# Patient Record
Sex: MALE | Race: WHITE | NOT HISPANIC OR LATINO | URBAN - METROPOLITAN AREA
[De-identification: names, ages, dates, MRNs, and addresses within clinical notes are randomized per-mention and may not be internally consistent; named-entity substitution may affect disease eponyms.]

---

## 2024-03-29 ENCOUNTER — INPATIENT (INPATIENT)
Facility: HOSPITAL | Age: 49
LOS: 10 days | Discharge: PSYCHIATRIC OR SUBSTANCE USE FACILITY | End: 2024-04-09
Attending: HOSPITALIST | Admitting: INTERNAL MEDICINE
Payer: MEDICAID

## 2024-03-29 VITALS
WEIGHT: 240.08 LBS | HEART RATE: 125 BPM | RESPIRATION RATE: 24 BRPM | DIASTOLIC BLOOD PRESSURE: 79 MMHG | OXYGEN SATURATION: 99 % | SYSTOLIC BLOOD PRESSURE: 136 MMHG

## 2024-03-29 DIAGNOSIS — E87.29 OTHER ACIDOSIS: ICD-10-CM

## 2024-03-29 LAB
ALBUMIN SERPL ELPH-MCNC: 4.2 G/DL — SIGNIFICANT CHANGE UP (ref 3.5–5.2)
ALBUMIN SERPL ELPH-MCNC: 4.7 G/DL — SIGNIFICANT CHANGE UP (ref 3.5–5.2)
ALP SERPL-CCNC: 79 U/L — SIGNIFICANT CHANGE UP (ref 30–115)
ALP SERPL-CCNC: 87 U/L — SIGNIFICANT CHANGE UP (ref 30–115)
ALT FLD-CCNC: 22 U/L — SIGNIFICANT CHANGE UP (ref 0–41)
ALT FLD-CCNC: 24 U/L — SIGNIFICANT CHANGE UP (ref 0–41)
AMPHET UR-MCNC: NEGATIVE — SIGNIFICANT CHANGE UP
ANION GAP SERPL CALC-SCNC: 10 MMOL/L — SIGNIFICANT CHANGE UP (ref 7–14)
ANION GAP SERPL CALC-SCNC: 32 MMOL/L — HIGH (ref 7–14)
APAP SERPL-MCNC: <5 UG/ML — LOW (ref 10–30)
APPEARANCE UR: CLEAR — SIGNIFICANT CHANGE UP
AST SERPL-CCNC: 17 U/L — SIGNIFICANT CHANGE UP (ref 0–41)
AST SERPL-CCNC: 25 U/L — SIGNIFICANT CHANGE UP (ref 0–41)
BARBITURATES UR SCN-MCNC: NEGATIVE — SIGNIFICANT CHANGE UP
BASE EXCESS BLDV CALC-SCNC: 2 MMOL/L — SIGNIFICANT CHANGE UP (ref -2–3)
BASOPHILS # BLD AUTO: 0 K/UL — SIGNIFICANT CHANGE UP (ref 0–0.2)
BASOPHILS NFR BLD AUTO: 0 % — SIGNIFICANT CHANGE UP (ref 0–1)
BENZODIAZ UR-MCNC: POSITIVE
BILIRUB DIRECT SERPL-MCNC: 0.2 MG/DL — SIGNIFICANT CHANGE UP (ref 0–0.3)
BILIRUB INDIRECT FLD-MCNC: 0.8 MG/DL — SIGNIFICANT CHANGE UP (ref 0.2–1.2)
BILIRUB SERPL-MCNC: 1 MG/DL — SIGNIFICANT CHANGE UP (ref 0.2–1.2)
BILIRUB SERPL-MCNC: 1.1 MG/DL — SIGNIFICANT CHANGE UP (ref 0.2–1.2)
BILIRUB UR-MCNC: NEGATIVE — SIGNIFICANT CHANGE UP
BUN SERPL-MCNC: 14 MG/DL — SIGNIFICANT CHANGE UP (ref 10–20)
BUN SERPL-MCNC: 9 MG/DL — LOW (ref 10–20)
CA-I SERPL-SCNC: 1.13 MMOL/L — LOW (ref 1.15–1.33)
CALCIUM SERPL-MCNC: 8.6 MG/DL — SIGNIFICANT CHANGE UP (ref 8.4–10.5)
CALCIUM SERPL-MCNC: 9.7 MG/DL — SIGNIFICANT CHANGE UP (ref 8.4–10.5)
CHLORIDE SERPL-SCNC: 107 MMOL/L — SIGNIFICANT CHANGE UP (ref 98–110)
CHLORIDE SERPL-SCNC: 96 MMOL/L — LOW (ref 98–110)
CK SERPL-CCNC: 107 U/L — SIGNIFICANT CHANGE UP (ref 0–225)
CO2 SERPL-SCNC: 13 MMOL/L — LOW (ref 17–32)
CO2 SERPL-SCNC: 26 MMOL/L — SIGNIFICANT CHANGE UP (ref 17–32)
COCAINE METAB.OTHER UR-MCNC: NEGATIVE — SIGNIFICANT CHANGE UP
COLOR SPEC: YELLOW — SIGNIFICANT CHANGE UP
CREAT SERPL-MCNC: 0.9 MG/DL — SIGNIFICANT CHANGE UP (ref 0.7–1.5)
CREAT SERPL-MCNC: 1.3 MG/DL — SIGNIFICANT CHANGE UP (ref 0.7–1.5)
DIFF PNL FLD: NEGATIVE — SIGNIFICANT CHANGE UP
DRUG SCREEN 1, URINE RESULT: SIGNIFICANT CHANGE UP
EGFR: 105 ML/MIN/1.73M2 — SIGNIFICANT CHANGE UP
EGFR: 67 ML/MIN/1.73M2 — SIGNIFICANT CHANGE UP
EOSINOPHIL NFR BLD AUTO: 0 % — SIGNIFICANT CHANGE UP (ref 0–8)
ETHANOL SERPL-MCNC: <10 MG/DL — SIGNIFICANT CHANGE UP
FENTANYL UR QL: NEGATIVE — SIGNIFICANT CHANGE UP
GAS PNL BLDV: 137 MMOL/L — SIGNIFICANT CHANGE UP (ref 136–145)
GAS PNL BLDV: SIGNIFICANT CHANGE UP
GLUCOSE SERPL-MCNC: 122 MG/DL — HIGH (ref 70–99)
GLUCOSE SERPL-MCNC: 269 MG/DL — HIGH (ref 70–99)
GLUCOSE UR QL: NEGATIVE MG/DL — SIGNIFICANT CHANGE UP
HCO3 BLDV-SCNC: 26 MMOL/L — SIGNIFICANT CHANGE UP (ref 22–29)
HCT VFR BLD CALC: 48.3 % — SIGNIFICANT CHANGE UP (ref 42–52)
HCT VFR BLDA CALC: 47 % — SIGNIFICANT CHANGE UP (ref 39–51)
HGB BLD CALC-MCNC: 15.5 G/DL — SIGNIFICANT CHANGE UP (ref 12.6–17.4)
HGB BLD-MCNC: 17.4 G/DL — SIGNIFICANT CHANGE UP (ref 14–18)
KETONES UR-MCNC: NEGATIVE MG/DL — SIGNIFICANT CHANGE UP
LACTATE BLDV-MCNC: 0.8 MMOL/L — SIGNIFICANT CHANGE UP (ref 0.5–2)
LEUKOCYTE ESTERASE UR-ACNC: NEGATIVE — SIGNIFICANT CHANGE UP
LYMPHOCYTES # BLD AUTO: 25 % — SIGNIFICANT CHANGE UP (ref 20.5–51.1)
LYMPHOCYTES # BLD AUTO: 4.75 K/UL — HIGH (ref 1.2–3.4)
MAGNESIUM SERPL-MCNC: 2.2 MG/DL — SIGNIFICANT CHANGE UP (ref 1.8–2.4)
MAGNESIUM SERPL-MCNC: 2.3 MG/DL — SIGNIFICANT CHANGE UP (ref 1.8–2.4)
MANUAL SMEAR VERIFICATION: SIGNIFICANT CHANGE UP
MCHC RBC-ENTMCNC: 31.4 PG — HIGH (ref 27–31)
MCHC RBC-ENTMCNC: 36 G/DL — SIGNIFICANT CHANGE UP (ref 32–37)
MCV RBC AUTO: 87 FL — SIGNIFICANT CHANGE UP (ref 80–94)
METHADONE UR-MCNC: NEGATIVE — SIGNIFICANT CHANGE UP
MONOCYTES # BLD AUTO: 1.33 K/UL — HIGH (ref 0.1–0.6)
MONOCYTES NFR BLD AUTO: 7 % — SIGNIFICANT CHANGE UP (ref 1.7–9.3)
NEUTROPHILS # BLD AUTO: 12.93 K/UL — HIGH (ref 1.4–6.5)
NEUTROPHILS NFR BLD AUTO: 68 % — SIGNIFICANT CHANGE UP (ref 42.2–75.2)
NITRITE UR-MCNC: NEGATIVE — SIGNIFICANT CHANGE UP
NRBC # BLD: 0 /100 WBCS — SIGNIFICANT CHANGE UP (ref 0–0)
NRBC # BLD: SIGNIFICANT CHANGE UP /100 WBCS (ref 0–0)
OPIATES UR-MCNC: NEGATIVE — SIGNIFICANT CHANGE UP
OXYCODONE UR-MCNC: NEGATIVE — SIGNIFICANT CHANGE UP
PCO2 BLDV: 36 MMHG — LOW (ref 42–55)
PCP UR-MCNC: NEGATIVE — SIGNIFICANT CHANGE UP
PH BLDV: 7.46 — HIGH (ref 7.32–7.43)
PH UR: 7 — SIGNIFICANT CHANGE UP (ref 5–8)
PHOSPHATE SERPL-MCNC: 3.2 MG/DL — SIGNIFICANT CHANGE UP (ref 2.1–4.9)
PLAT MORPH BLD: NORMAL — SIGNIFICANT CHANGE UP
PLATELET # BLD AUTO: 235 K/UL — SIGNIFICANT CHANGE UP (ref 130–400)
PMV BLD: 13 FL — HIGH (ref 7.4–10.4)
PO2 BLDV: 49 MMHG — HIGH (ref 25–45)
POTASSIUM BLDV-SCNC: 3.1 MMOL/L — LOW (ref 3.5–5.1)
POTASSIUM SERPL-MCNC: 2.9 MMOL/L — LOW (ref 3.5–5)
POTASSIUM SERPL-MCNC: 3.4 MMOL/L — LOW (ref 3.5–5)
POTASSIUM SERPL-SCNC: 2.9 MMOL/L — LOW (ref 3.5–5)
POTASSIUM SERPL-SCNC: 3.4 MMOL/L — LOW (ref 3.5–5)
PROPOXYPHENE QUALITATIVE URINE RESULT: NEGATIVE — SIGNIFICANT CHANGE UP
PROT SERPL-MCNC: 6.3 G/DL — SIGNIFICANT CHANGE UP (ref 6–8)
PROT SERPL-MCNC: 6.7 G/DL — SIGNIFICANT CHANGE UP (ref 6–8)
PROT UR-MCNC: NEGATIVE MG/DL — SIGNIFICANT CHANGE UP
RBC # BLD: 5.55 M/UL — SIGNIFICANT CHANGE UP (ref 4.7–6.1)
RBC # FLD: 11.4 % — LOW (ref 11.5–14.5)
RBC BLD AUTO: NORMAL — SIGNIFICANT CHANGE UP
SALICYLATES SERPL-MCNC: <0.3 MG/DL — LOW (ref 4–30)
SAO2 % BLDV: 85.4 % — SIGNIFICANT CHANGE UP (ref 67–88)
SARS-COV-2 RNA SPEC QL NAA+PROBE: SIGNIFICANT CHANGE UP
SODIUM SERPL-SCNC: 141 MMOL/L — SIGNIFICANT CHANGE UP (ref 135–146)
SODIUM SERPL-SCNC: 143 MMOL/L — SIGNIFICANT CHANGE UP (ref 135–146)
SP GR SPEC: 1.01 — SIGNIFICANT CHANGE UP (ref 1–1.03)
THC UR QL: NEGATIVE — SIGNIFICANT CHANGE UP
UROBILINOGEN FLD QL: 1 MG/DL — SIGNIFICANT CHANGE UP (ref 0.2–1)
WBC # BLD: 19.01 K/UL — HIGH (ref 4.8–10.8)
WBC # FLD AUTO: 19.01 K/UL — HIGH (ref 4.8–10.8)

## 2024-03-29 PROCEDURE — 83605 ASSAY OF LACTIC ACID: CPT

## 2024-03-29 PROCEDURE — 84295 ASSAY OF SERUM SODIUM: CPT

## 2024-03-29 PROCEDURE — 99223 1ST HOSP IP/OBS HIGH 75: CPT

## 2024-03-29 PROCEDURE — 85027 COMPLETE CBC AUTOMATED: CPT

## 2024-03-29 PROCEDURE — 82088 ASSAY OF ALDOSTERONE: CPT

## 2024-03-29 PROCEDURE — 70551 MRI BRAIN STEM W/O DYE: CPT | Mod: MC

## 2024-03-29 PROCEDURE — 84244 ASSAY OF RENIN: CPT

## 2024-03-29 PROCEDURE — 82803 BLOOD GASES ANY COMBINATION: CPT

## 2024-03-29 PROCEDURE — 84132 ASSAY OF SERUM POTASSIUM: CPT

## 2024-03-29 PROCEDURE — 84484 ASSAY OF TROPONIN QUANT: CPT

## 2024-03-29 PROCEDURE — 74177 CT ABD & PELVIS W/CONTRAST: CPT | Mod: MC

## 2024-03-29 PROCEDURE — 82962 GLUCOSE BLOOD TEST: CPT

## 2024-03-29 PROCEDURE — 70450 CT HEAD/BRAIN W/O DYE: CPT | Mod: 26,MC

## 2024-03-29 PROCEDURE — 83835 ASSAY OF METANEPHRINES: CPT

## 2024-03-29 PROCEDURE — 83735 ASSAY OF MAGNESIUM: CPT

## 2024-03-29 PROCEDURE — 80053 COMPREHEN METABOLIC PANEL: CPT

## 2024-03-29 PROCEDURE — 93005 ELECTROCARDIOGRAM TRACING: CPT

## 2024-03-29 PROCEDURE — 84100 ASSAY OF PHOSPHORUS: CPT

## 2024-03-29 PROCEDURE — 85025 COMPLETE CBC W/AUTO DIFF WBC: CPT

## 2024-03-29 PROCEDURE — 36415 COLL VENOUS BLD VENIPUNCTURE: CPT

## 2024-03-29 PROCEDURE — 85018 HEMOGLOBIN: CPT

## 2024-03-29 PROCEDURE — 80048 BASIC METABOLIC PNL TOTAL CA: CPT

## 2024-03-29 PROCEDURE — 71045 X-RAY EXAM CHEST 1 VIEW: CPT | Mod: 26

## 2024-03-29 PROCEDURE — 82533 TOTAL CORTISOL: CPT

## 2024-03-29 PROCEDURE — 93306 TTE W/DOPPLER COMPLETE: CPT

## 2024-03-29 PROCEDURE — 82330 ASSAY OF CALCIUM: CPT

## 2024-03-29 PROCEDURE — 85014 HEMATOCRIT: CPT

## 2024-03-29 PROCEDURE — 80076 HEPATIC FUNCTION PANEL: CPT

## 2024-03-29 PROCEDURE — 99285 EMERGENCY DEPT VISIT HI MDM: CPT

## 2024-03-29 PROCEDURE — 87635 SARS-COV-2 COVID-19 AMP PRB: CPT

## 2024-03-29 RX ORDER — AMLODIPINE BESYLATE 2.5 MG/1
10 TABLET ORAL ONCE
Refills: 0 | Status: COMPLETED | OUTPATIENT
Start: 2024-03-29 | End: 2024-03-29

## 2024-03-29 RX ORDER — SODIUM CHLORIDE 9 MG/ML
1000 INJECTION, SOLUTION INTRAVENOUS
Refills: 0 | Status: DISCONTINUED | OUTPATIENT
Start: 2024-03-29 | End: 2024-03-29

## 2024-03-29 RX ORDER — POTASSIUM CHLORIDE 20 MEQ
40 PACKET (EA) ORAL ONCE
Refills: 0 | Status: COMPLETED | OUTPATIENT
Start: 2024-03-29 | End: 2024-03-29

## 2024-03-29 RX ORDER — HALOPERIDOL DECANOATE 100 MG/ML
5 INJECTION INTRAMUSCULAR ONCE
Refills: 0 | Status: COMPLETED | OUTPATIENT
Start: 2024-03-29 | End: 2024-03-29

## 2024-03-29 RX ORDER — MIDAZOLAM HYDROCHLORIDE 1 MG/ML
5 INJECTION, SOLUTION INTRAMUSCULAR; INTRAVENOUS ONCE
Refills: 0 | Status: DISCONTINUED | OUTPATIENT
Start: 2024-03-29 | End: 2024-03-29

## 2024-03-29 RX ORDER — NICOTINE POLACRILEX 2 MG
1 GUM BUCCAL DAILY
Refills: 0 | Status: DISCONTINUED | OUTPATIENT
Start: 2024-03-29 | End: 2024-03-30

## 2024-03-29 RX ORDER — SODIUM CHLORIDE 9 MG/ML
1000 INJECTION, SOLUTION INTRAVENOUS ONCE
Refills: 0 | Status: COMPLETED | OUTPATIENT
Start: 2024-03-29 | End: 2024-03-29

## 2024-03-29 RX ORDER — ENOXAPARIN SODIUM 100 MG/ML
40 INJECTION SUBCUTANEOUS EVERY 24 HOURS
Refills: 0 | Status: DISCONTINUED | OUTPATIENT
Start: 2024-03-29 | End: 2024-04-09

## 2024-03-29 RX ORDER — THIAMINE MONONITRATE (VIT B1) 100 MG
100 TABLET ORAL DAILY
Refills: 0 | Status: DISCONTINUED | OUTPATIENT
Start: 2024-03-30 | End: 2024-04-09

## 2024-03-29 RX ORDER — THIAMINE MONONITRATE (VIT B1) 100 MG
100 TABLET ORAL ONCE
Refills: 0 | Status: COMPLETED | OUTPATIENT
Start: 2024-03-29 | End: 2024-03-29

## 2024-03-29 RX ORDER — CHLORHEXIDINE GLUCONATE 213 G/1000ML
1 SOLUTION TOPICAL
Refills: 0 | Status: DISCONTINUED | OUTPATIENT
Start: 2024-03-29 | End: 2024-04-01

## 2024-03-29 RX ORDER — DIAZEPAM 5 MG
5 TABLET ORAL ONCE
Refills: 0 | Status: DISCONTINUED | OUTPATIENT
Start: 2024-03-29 | End: 2024-03-29

## 2024-03-29 RX ORDER — ACETAMINOPHEN 500 MG
650 TABLET ORAL EVERY 6 HOURS
Refills: 0 | Status: DISCONTINUED | OUTPATIENT
Start: 2024-03-29 | End: 2024-04-09

## 2024-03-29 RX ORDER — SODIUM CHLORIDE 9 MG/ML
1000 INJECTION, SOLUTION INTRAVENOUS
Refills: 0 | Status: DISCONTINUED | OUTPATIENT
Start: 2024-03-29 | End: 2024-04-03

## 2024-03-29 RX ORDER — POTASSIUM CHLORIDE 20 MEQ
20 PACKET (EA) ORAL ONCE
Refills: 0 | Status: COMPLETED | OUTPATIENT
Start: 2024-03-29 | End: 2024-03-29

## 2024-03-29 RX ORDER — DIAZEPAM 5 MG
10 TABLET ORAL ONCE
Refills: 0 | Status: DISCONTINUED | OUTPATIENT
Start: 2024-03-29 | End: 2024-03-29

## 2024-03-29 RX ORDER — LANOLIN ALCOHOL/MO/W.PET/CERES
3 CREAM (GRAM) TOPICAL AT BEDTIME
Refills: 0 | Status: DISCONTINUED | OUTPATIENT
Start: 2024-03-29 | End: 2024-04-09

## 2024-03-29 RX ORDER — ONDANSETRON 8 MG/1
4 TABLET, FILM COATED ORAL EVERY 8 HOURS
Refills: 0 | Status: DISCONTINUED | OUTPATIENT
Start: 2024-03-29 | End: 2024-04-04

## 2024-03-29 RX ORDER — FOLIC ACID 0.8 MG
1 TABLET ORAL DAILY
Refills: 0 | Status: DISCONTINUED | OUTPATIENT
Start: 2024-03-29 | End: 2024-04-09

## 2024-03-29 RX ADMIN — Medication 2 MILLIGRAM(S): at 01:13

## 2024-03-29 RX ADMIN — MIDAZOLAM HYDROCHLORIDE 5 MILLIGRAM(S): 1 INJECTION, SOLUTION INTRAMUSCULAR; INTRAVENOUS at 00:48

## 2024-03-29 RX ADMIN — Medication 10 MILLIGRAM(S): at 01:07

## 2024-03-29 RX ADMIN — SODIUM CHLORIDE 125 MILLILITER(S): 9 INJECTION, SOLUTION INTRAVENOUS at 17:25

## 2024-03-29 RX ADMIN — Medication 20 MILLIEQUIVALENT(S): at 15:05

## 2024-03-29 RX ADMIN — AMLODIPINE BESYLATE 10 MILLIGRAM(S): 2.5 TABLET ORAL at 18:35

## 2024-03-29 RX ADMIN — Medication 1 TABLET(S): at 17:30

## 2024-03-29 RX ADMIN — Medication 50 MILLIEQUIVALENT(S): at 03:00

## 2024-03-29 RX ADMIN — SODIUM CHLORIDE 1000 MILLILITER(S): 9 INJECTION, SOLUTION INTRAVENOUS at 03:00

## 2024-03-29 RX ADMIN — ENOXAPARIN SODIUM 40 MILLIGRAM(S): 100 INJECTION SUBCUTANEOUS at 17:29

## 2024-03-29 RX ADMIN — Medication 1 MILLIGRAM(S): at 17:29

## 2024-03-29 RX ADMIN — Medication 1 PATCH: at 22:07

## 2024-03-29 RX ADMIN — Medication 40 MILLIEQUIVALENT(S): at 22:07

## 2024-03-29 RX ADMIN — Medication 100 MILLIGRAM(S): at 07:08

## 2024-03-29 RX ADMIN — Medication 25 MILLIGRAM(S): at 22:07

## 2024-03-29 RX ADMIN — HALOPERIDOL DECANOATE 5 MILLIGRAM(S): 100 INJECTION INTRAMUSCULAR at 01:14

## 2024-03-29 RX ADMIN — Medication 5 MILLIGRAM(S): at 07:57

## 2024-03-29 NOTE — ED ADULT NURSE REASSESSMENT NOTE - NS ED NURSE REASSESS COMMENT FT1
Pt driven to ED and removed from car to stretcher.  Pt extremely combative.  5 haldol and 2 ativan given on arrival. Security at bedside and 4 point restraints applied.  5 versed given.  IV established and placed on CM and end tidal

## 2024-03-29 NOTE — ED PROVIDER NOTE - OBJECTIVE STATEMENT
49-year-old male Irish-speaking brought in by hospital security and EMS for agitated behavior outside of the hospital.  Patient was aggressive requiring 4-point restraints chemical sedation by Dr. Ha.  Patient without signs of trauma.  ABCs intact.  Vitally stable.  Patient reportedly stated that he was drinking a lot of alcohol prior to coming into the ER.  Reportedly 1 point Evacal every day never stopped 2 days ago.  Patient given 5 of Haldol to Ativan and 5 of Versed 10 of IV Valium. 49-year-old male Rwandan-speaking brought in by hospital security and EMS for agitated behavior outside of the hospital.  Patient was aggressive requiring 4-point restraints chemical sedation.  Patient without signs of trauma.  ABCs intact.  Vitally stable.  Patient reportedly stated that he was drinking a lot of alcohol prior to coming into the ER.  Reportedly 1 pint of alcohol every day never stopped 2 days ago.  Patient given 5 of Haldol to Ativan and 5 of Versed 10 of IV Valium.

## 2024-03-29 NOTE — ED ADULT TRIAGE NOTE - CHIEF COMPLAINT QUOTE
Assisted from car, extremely agitated.  Per family, pt drinks approx. 1 pint vodka daily and has not had a drink in 2 1/2 days

## 2024-03-29 NOTE — PATIENT PROFILE ADULT - STATED REASON FOR ADMISSION
I was driving with friend and did  not feel good, confused I was driving with friend and did  not feel good, confused, I took 7 melatonin tabs last night to sleep.

## 2024-03-29 NOTE — BH CHART NOTE - NSEVENTNOTEFT_PSY_ALL_CORE
Psychiatry consult request received today at 16:07 for routine psychiatry evaluation of suicidality.     Consult information obtained as follows: 48 yo, Chinese speaking male, BIB EMS a/b a friend, presented very agitated requiring multiple rounds of sedatives and reported heavy drinking along w/ SI, BAL was negative, today denying excessive drinking and SI but is a bit confused, not attending well on interview, reports hallucinations but also says in his dreams and does have some dysautonomia so he is being treated for alcohol withdrawal but remains on 1:1 for suicide precautions pending psych evaluation.     This writer informed that [facility] psychiatry will not be able to see patient today and to place the consult request with on-call weekend psychiatry coverage for patient to be seen on Saturday. Otherwise [facility] can follow up on patient Monday.

## 2024-03-29 NOTE — H&P ADULT - NSHPLABSRESULTS_GEN_ALL_CORE
17.4   19. )-----------( 235      ( 29 Mar 2024 01:42 )             48.3           141  |  96<L>  |  14  ----------------------------<  269<H>  2.9<L>   |  13<L>  |  1.3    Ca    9.7      29 Mar 2024 01:42  Mg     2.3         TPro  6.7  /  Alb  4.7  /  TBili  1.1  /  DBili  x   /  AST  17  /  ALT  24  /  AlkPhos  87              Urinalysis Basic - ( 29 Mar 2024 03:45 )    Color: Yellow / Appearance: Clear / S.009 / pH: x  Gluc: x / Ketone: Negative mg/dL  / Bili: Negative / Urobili: 1.0 mg/dL   Blood: x / Protein: Negative mg/dL / Nitrite: Negative   Leuk Esterase: Negative / RBC: x / WBC x   Sq Epi: x / Non Sq Epi: x / Bacteria: x        CARDIAC MARKERS ( 29 Mar 2024 01:42 )  x     / x     / 107 U/L / x     / x            < from: CT Head No Cont (24 @ 01:52) >      < from: Xray Chest 1 View AP/PA (24 @ 02:15) >      Impression:    No radiographic evidence of acute cardiopulmonary disease.        < from: 12 Lead ECG (24 @ 04:48) >      Diagnosis Line Sinus bradycardia with 1st degree A-V block  Otherwise normal ECG

## 2024-03-29 NOTE — H&P ADULT - NS ATTEND AMEND GEN_ALL_CORE FT
49-year-old German-speaking male brought in by hospital security and EMS for agitated behavior outside of the hospital.  Patient was aggressive in ED requiring 4-point restraints and chemical sedation.  Patient reportedly stated to EMS that he was drinking a lot of alcohol prior to coming into the ER.  Reportedly 1 pint of alcohol every day and stopped 2 days ago    alcohol withdrawal / alcoholic ketoacidosis / suicidal ideation / suspected thiamine and folate deficiency     - IV ativan detox protocol   - D51/2NS   - check serial chemistries   - may add Precedex if agitated with ativan   - thiamine and folate   - 1:1 sit   - psych and detox consults   - DVT prophylaxis 49-year-old Macedonian-speaking male brought in by hospital security and EMS for agitated behavior outside of the hospital.  Patient was aggressive in ED requiring 4-point restraints and chemical sedation.  Patient reportedly stated to EMS that he was drinking a lot of alcohol prior to coming into the ER.  Reportedly 1 pint of alcohol every day and stopped 2 days ago    alcohol withdrawal / alcoholic ketoacidosis / suicidal ideation / suspected thiamine and folate deficiency / hypokalemia     - IV ativan detox protocol   - D51/2NS   - supplement lytes   - check serial chemistries   - may add Precedex if agitated with ativan   - thiamine and folate   - 1:1 sit   - psych and detox consults   - DVT prophylaxis

## 2024-03-29 NOTE — CHART NOTE - NSCHARTNOTEFT_GEN_A_CORE
Patient seen at bedside.   Awake + alert oriented x 3.   Prydeinig  utilized [id number]     Patient states that he does not drink ETOH regularly.   Last drink was on his birthday 6 days ago.   On exam patient with good mental status. No asterixis.     Case discussed with Intensivist on duty     Recommends holding standing Ativan   Start on Symptom triggered Ativan therapy + small dose Librium taper.     Cont 1:1 monitoring for suicidal ideation + CIWA score.     Lance MANCUSO

## 2024-03-29 NOTE — H&P ADULT - NSHPPHYSICALEXAM_GEN_ALL_CORE
Vital Signs Last 24 Hrs  T(C): 36.4 (29 Mar 2024 15:08), Max: 36.4 (29 Mar 2024 15:08)  T(F): 97.5 (29 Mar 2024 15:08), Max: 97.5 (29 Mar 2024 15:08)  HR: 85 (29 Mar 2024 15:08) (59 - 125)  BP: 176/100 (29 Mar 2024 15:08) (96/58 - 176/100)  BP(mean): 123 (29 Mar 2024 07:46) (123 - 123)  RR: 20 (29 Mar 2024 15:08) (16 - 24)  SpO2: 98% (29 Mar 2024 15:08) (95% - 100%)    PHYSICAL EXAM:      Constitutional: A&Ox2, know year and month, intermittent confusion and difficulty sustaining attention, some of his answers are not making sense, for example reports taking blood pressure medicine Norvasc for his depression  Respiratory: cta b/l  Cardiovascular: s1 s2 rrr  Gastrointestinal: soft nt  nd + bs no rebound or guarding  Genitourinary: no cva tenderness  Extremities: normal rom, no edema, calf tenderness  Neurological:no focal deficits  Skin: no rash

## 2024-03-29 NOTE — ED PROVIDER NOTE - CLINICAL SUMMARY MEDICAL DECISION MAKING FREE TEXT BOX
Patient likely an alcoholic keto acidosis complicated by DTs.  Patient will be admitted to a monitored setting.

## 2024-03-29 NOTE — H&P ADULT - ASSESSMENT
Patient is a 49-year-old male Emirati-speaking brought in by hospital security and EMS for agitated behavior outside of the hospital.  Patient was aggressive in ED requiring 4-point restraints chemical sedation.  Patient reportedly stated to EMS that he was drinking a lot of alcohol prior to coming into the ER.  Reportedly 1 pint of alcohol every day never stopped 2 days ago.  Patient currently denies abusing alcohol stating he drinks 1 bottle of beer a month. Patient states last drink was about 1 week ago. Patient denies hx of tremors or seizures. Patient reports visual hallucinations in dreams. Patient reported suicidal ideations in ED but currently denies any. Patient states has a hx of depression and suicidal ideations.     #alcohol abuse with withdrawal suspected delirium tremens   #metabolic acidosis with elevated anion gap secondary to above  #hypokalemia  -admit to stepdown  -telemetry monitoring  -constant observation  -psychiatry consult  -addiction medicine consult  -ativan taper IV  -thiamine and folic acid  -D51/2 ns at 125cc/hr  -trend bmp  -monitor lytes and supplement prn  -d/w Dr Son    #hx of htn  -monitor bp  -dash diet  -consider adding a catapress prn but will be receiving iv ativan so assess response first    #DVT prophylaxis  -SCD and lovenox  #CHG bath Patient is a 49-year-old male Latvian-speaking brought in by hospital security and EMS for agitated behavior outside of the hospital.  Patient was aggressive in ED requiring 4-point restraints chemical sedation.  Patient reportedly stated to EMS that he was drinking a lot of alcohol prior to coming into the ER.  Reportedly 1 pint of alcohol every day never stopped 2 days ago.  Patient currently denies abusing alcohol stating he drinks 1 bottle of beer a month. Patient states last drink was about 1 week ago. Patient denies hx of tremors or seizures. Patient reports visual hallucinations in dreams. Patient reported suicidal ideations in ED but currently denies any. Patient states has a hx of depression and suicidal ideations.     #alcohol abuse with withdrawal suspected delirium tremens   #metabolic acidosis with elevated anion gap secondary to above  #hypokalemia  -admit to stepdown  -telemetry monitoring  -constant observation  -psychiatry consult  -addiction medicine consult  -ativan taper IV  -thiamine and folic acid  -D51/2 ns at 125cc/hr  -trend bmp  -monitor lytes and supplement prn  -check echo as per ICU fellow note  -d/w Dr Son    #hx of htn  -monitor bp  -dash diet  -consider adding a catapress prn but will be receiving iv ativan so assess response first    #DVT prophylaxis  -SCD and lovenox  #CHG bath Patient is a 49-year-old male Belarusian-speaking brought in by hospital security and EMS for agitated behavior outside of the hospital.  Patient was aggressive in ED requiring 4-point restraints chemical sedation.  Patient reportedly stated to EMS that he was drinking a lot of alcohol prior to coming into the ER.  Reportedly 1 pint of alcohol every day never stopped 2 days ago.  Patient currently denies abusing alcohol stating he drinks 1 bottle of beer a month. Patient states last drink was about 1 week ago. Patient denies hx of tremors or seizures. Patient reports visual hallucinations in dreams. Patient reported suicidal ideations in ED but currently denies any. Patient states has a hx of depression and suicidal ideations.     #alcohol abuse with withdrawal suspected delirium tremens   #metabolic acidosis with elevated anion gap secondary to above  #hypokalemia  -admit to stepdown  -telemetry monitoring  -constant observation  -psychiatry consult, spoke with [facility] attending, case reviewed, requested the [facility] service be recalled in the morning and they will do the consult then   -addiction medicine consult  -ativan taper IV  -thiamine and folic acid  -D51/2 ns at 125cc/hr  -trend bmp  -monitor lytes and supplement prn  -check echo as per ICU fellow note  -d/w Dr Son    #hx of htn  -monitor bp  -dash diet  -consider adding a catapress prn but will be receiving iv ativan so assess response first    #DVT prophylaxis  -SCD and lovenox  #CHG bath

## 2024-03-29 NOTE — ED PROVIDER NOTE - ATTENDING APP SHARED VISIT CONTRIBUTION OF CARE
49-year-old male, Algerian-speaking presented with his friends, noted to be altered and not responding.  Patient was aggressive and required 4-point restraints and chemical sedation immediately.  Upon further history taking from his friends at bedside they reported that he has been drinking 1 pint of alcohol every day and stopped approximately 2 days ago.  Patient received 5 of Haldol, 2 of Ativan, 5 of Versed and then 10 mg of IV Valium.  Patient was stabilized and signed out pending further evaluation and disposition.

## 2024-03-29 NOTE — H&P ADULT - NSHPREVIEWOFSYSTEMS_GEN_ALL_CORE
General:	no fever, weight loss,  chills  Skin: no rash, ulcers  Ophthalmologic: no visual changes  ENMT:	no sore throat  Respiratory and Thorax: no cough, wheeze,  sob  Cardiovascular:	no chest pain, palpitations, dizziness  Gastrointestinal:	no nausea, vomiting, diarrhea, abd pain  Genitourinary:	no dysuria, hematuria  Musculoskeletal:	no joint pains  Neurological:	 no speech disturbance, focal weakness, numbness  Psychiatric:	+ depression, anxiety, psychosis  Hematology/Lymphatics:	no anemia  Endocrine:	no polyuria, polydipsia

## 2024-03-29 NOTE — CONSULT NOTE ADULT - ASSESSMENT
IMPRESSION:    Altered mental status   EtOH withdrawal   HAGMA possibly AKA vs DKA?    PLAN:    CNS: CIWA monitoring. Thiamine. Folate. UDS positive for BZD, possibly. CTH noted. Psych follow up.     HEENT: Oral care    PULMONARY:  HOB @ 45 degrees. CXR noted, no infiltrate. On 2L O2, wean O2 as tolerated, target SpO2 >92%.     CARDIOVASCULAR: Check TTE. Start D5NS at 100 cc/hour.     GI: GI prophylaxis.  Feeding when awake. S+S eval.     RENAL:  Follow up lytes.  Correct as needed. Monitor Cr, no known baseline.     INFECTIOUS DISEASE: Follow up cultures. UA negative.     HEMATOLOGICAL:  DVT prophylaxis.    ENDOCRINE:  Follow up FS.  Insulin protocol if needed. Check B-OH.     MUSCULOSKELETAL: Bed rest.     SDU          IMPRESSION:    Altered mental status   EtOH withdrawal   HAGMA possibly AKA vs DKA?    PLAN:    CNS: CIWA taper. Thiamine. Folate. UDS positive for BZD, possibly. CTH noted. Psych follow up. If uncontrolled, may need precedex.     HEENT: Oral care    PULMONARY:  HOB @ 45 degrees. CXR noted, no infiltrate. On 2L O2, wean O2 as tolerated, target SpO2 >92%.     CARDIOVASCULAR: Check TTE. Start D5NS at 100 cc/hour. Check lactate.     GI: GI prophylaxis.  Feeding when awake. S+S eval.     RENAL:  Follow up lytes.  Correct as needed. Monitor Cr, no known baseline. Repeat BMP in PM.     INFECTIOUS DISEASE: Follow up cultures. UA negative.     HEMATOLOGICAL:  DVT prophylaxis.    ENDOCRINE:  Follow up FS.  Insulin protocol if needed. Check B-OH.     MUSCULOSKELETAL: Bed rest.     SDU          IMPRESSION:  early DTs  Altered mental status   EtOH withdrawal   HAGMA possibly AKA vs DKA?  Suicidal ideation    PLAN:    CNS: CIWA taper. Thiamine. Folate. UDS positive for BZD, possibly. CTH noted. Psych follow up. If uncontrolled, may need precedex. 1:1 until cleared by psych.     HEENT: Oral care    PULMONARY:  HOB @ 45 degrees. CXR noted, no infiltrate. On 2L O2, wean O2 as tolerated, target SpO2 >92%.     CARDIOVASCULAR: Check TTE. Start D5NS at 100 cc/hour. Check lactate.     GI: GI prophylaxis.  Feeding when awake. S+S eval.     RENAL:  Follow up lytes.  Correct as needed. Monitor Cr, no known baseline. Repeat BMP in PM.     INFECTIOUS DISEASE: Follow up cultures. UA negative. procal    HEMATOLOGICAL:  DVT prophylaxis.    ENDOCRINE:  Follow up FS.  Insulin protocol if needed. Check B-OH.     MUSCULOSKELETAL: Bed rest.     SDU

## 2024-03-29 NOTE — H&P ADULT - HISTORY OF PRESENT ILLNESS
Patient is a 49-year-old male Serbian-speaking brought in by hospital security and EMS for agitated behavior outside of the hospital.  Patient was aggressive in ED requiring 4-point restraints chemical sedation.  Patient reportedly stated to EMS that he was drinking a lot of alcohol prior to coming into the ER.  Reportedly 1 pint of alcohol every day never stopped 2 days ago.  Patient currently denies abusing alcohol stating he drinks 1 bottle of beer a month. Patient states last drink was about 1 week ago. Patient denies hx of tremors or seizures. Patient reports visual hallucinations in dreams. Patient reported suicidal ideations in ED but currently denies any. Patient states has a hx of depression and suicidal ideations.

## 2024-03-29 NOTE — ED PROVIDER NOTE - PROGRESS NOTE DETAILS
Will get labs CT reevaluate Spoke with patient's best friend and son.  Patient reportedly moved to the [place] with his [place] however since then has been having reported visual elucidation's of people that are following him or feeling like people in the street as the [employer] and are coming to find him.  According to best friend and send patient is not a daily alcohol drinker and is advancing does not take any drugs and only recently started drinking approximately 1 to 2 weeks ago because he felt that he had significant insomnia since moving from [place].  Patient believes that the alcohol would help with sleep.  He kept drinking more more however because it was not helping he stopped drinking 2 days ago.  Per patient's best friend patient has been awake for approximately 5 to 7 days without sleep and has been acting erratically. Spoke with patient's best friend and son.  Patient reportedly moved to the [place] with his [place] however since then has been having reported visual hallucinations of people that are following him or feeling like people in the street as the [employer] and are coming to find him.  According to best friend and send patient is not a daily alcohol drinker and is advancing does not take any drugs and only recently started drinking approximately 1 to 2 weeks ago because he felt that he had significant insomnia since moving from [place].  Patient believes that the alcohol would help with sleep.  He kept drinking more more however because it was not helping he stopped drinking 2 days ago.  Per patient's best friend patient has been awake for approximately 5 to 7 days without sleep and has been acting erratically. pt s/o to me from Dr. Ha- labs ekg imaging reviewed. resting comfortably, arousable. ss Patient awake. Consulted tele-psych, will evalaute [facility]: Patient reevaluated.  Patient is much calmer now with only minor tremors at this time.  Diagnostic testing reviewed.  Patient has elevated white count.  He has hypokalemia.  He has acidosis as evidenced by low CO2 level accompanied with a high anion gap.  Chest x-ray is negative CT head negative my opinion, the the patient is likely suffering significant alcohol withdrawal symptoms.  He may begin alcoholic ketoacidosis.  In addition his hallucinations and delusions may be symptomatic of delirium tremens.  Spoke with psychiatry.  Will defer psychiatric consultation at this time.  Case discussed with Dr. Fortune, her attending intensivist.  Patient should be admitted to stepdown unit.  Case to . Discussed with Dr. Son, attending hospitalist.  Accepts patient for admission.

## 2024-03-29 NOTE — PATIENT PROFILE ADULT - SURGICAL SITE INCISION
Patient is a 50y old  Male who presents with a chief complaint of PE (06 Jun 2019 07:07)      50 year old male with PMH of  HTN and DM2, who presented with Perforated Antral Gastric Ulcer, s/p Emergent Exp-lap Oomentopexy and plication 5/11, Proxismal afib,  with elevated troponin post-operatively indicative of NSTEMI, started on Amiodarone and converted to sinus rhythm,  left foot osteo s/p debridement 5/28, now presenting for CP and dyspnea x 1 day. Admitted for sub segmental PE RUL. on Heparin     Interval HPI/Overnight events:       T(C): 36.8 (06-06-19 @ 07:19), Max: 38.2 (06-06-19 @ 03:39)  HR: 55 (06-06-19 @ 07:19) (55 - 90)  BP: 119/77 (06-06-19 @ 07:19) (115/69 - 160/75)  RR: 17 (06-06-19 @ 07:19) (16 - 18)  SpO2: 96% (06-06-19 @ 07:19) (94% - 97%)  Wt(kg): --    I&O's Summary    05 Jun 2019 07:01  -  06 Jun 2019 07:00  --------------------------------------------------------  IN: 570 mL / OUT: 0 mL / NET: 570 mL    06 Jun 2019 07:01  -  06 Jun 2019 08:22  --------------------------------------------------------  IN: 15 mL / OUT: 0 mL / NET: 15 mL        LABS:                        8.4    6.71  )-----------( 306      ( 06 Jun 2019 07:54 )             26.7     06-06    144  |  111<H>  |  15  ----------------------------<  193<H>  3.7   |  26  |  1.30    Ca    7.9<L>      06 Jun 2019 05:05    TPro  6.7  /  Alb  2.9<L>  /  TBili  0.2  /  DBili  x   /  AST  18  /  ALT  17  /  AlkPhos  118  06-05    PT/INR - ( 06 Jun 2019 01:27 )   PT: 12.8 sec;   INR: 1.13 ratio         PTT - ( 06 Jun 2019 07:54 )  PTT:63.1 sec  CAPILLARY BLOOD GLUCOSE      POCT Blood Glucose.: 208 mg/dL (06 Jun 2019 07:44)  POCT Blood Glucose.: 106 mg/dL (05 Jun 2019 11:41)             MEDICATIONS  (STANDING):  amiodarone    Tablet 200 milliGRAM(s) Oral daily  dextrose 5%. 1000 milliLiter(s) (50 mL/Hr) IV Continuous <Continuous>  dextrose 50% Injectable 12.5 Gram(s) IV Push once  dextrose 50% Injectable 25 Gram(s) IV Push once  dextrose 50% Injectable 25 Gram(s) IV Push once  docusate sodium 100 milliGRAM(s) Oral three times a day  heparin  Infusion.  Unit(s)/Hr (15 mL/Hr) IV Continuous <Continuous>  insulin glargine Injectable (LANTUS) 10 Unit(s) SubCutaneous at bedtime  insulin lispro (HumaLOG) corrective regimen sliding scale   SubCutaneous three times a day before meals  insulin lispro (HumaLOG) corrective regimen sliding scale   SubCutaneous at bedtime  insulin lispro Injectable (HumaLOG) 3 Unit(s) SubCutaneous three times a day before meals  nicotine - 21 mG/24Hr(s) Patch 1 patch Transdermal daily  pantoprazole    Tablet 40 milliGRAM(s) Oral two times a day  polyethylene glycol 3350 17 Gram(s) Oral at bedtime  senna 2 Tablet(s) Oral at bedtime  tamsulosin 0.4 milliGRAM(s) Oral at bedtime    MEDICATIONS  (PRN):  ALBUTerol    90 MICROgram(s) HFA Inhaler 2 Puff(s) Inhalation every 6 hours PRN Shortness of Breath and/or Wheezing  dextrose 40% Gel 15 Gram(s) Oral once PRN Blood Glucose LESS THAN 70 milliGRAM(s)/deciliter  glucagon  Injectable 1 milliGRAM(s) IntraMuscular once PRN Glucose LESS THAN 70 milligrams/deciliter  heparin  Injectable 6500 Unit(s) IV Push every 6 hours PRN For aPTT less than 40  heparin  Injectable 3000 Unit(s) IV Push every 6 hours PRN For aPTT between 40 - 57      REVIEW OF SYSTEMS:  CONSTITUTIONAL: No fever, weight loss, or fatigue  EYES: No eye pain, visual disturbances, or discharge  ENMT: No difficulty hearing, tinnitus, vertigo; No sinus or throat pain  NECK: No pain or stiffness  RESPIRATORY: No cough, wheezing, chills or hemoptysis; No shortness of breath  CARDIOVASCULAR: No chest pain, palpitations, dizziness, or leg swelling  GASTROINTESTINAL: No abdominal or epigastric pain. No nausea, vomiting, or hematemesis; No diarrhea or constipation; No melena or hematochezia  GENITOURINARY: No dysuria, frequency, hematuria, or incontinence  NEUROLOGICAL: No headaches, memory loss, loss of strength, numbness, or tremors  SKIN: No itching, burning, rashes, or lesions   LYMPH NODES: No enlarged glands  ENDOCRINE: No heat or cold intolerance; No hair loss  MUSCULOSKELETAL: No joint pain or swelling; No muscle, back, or extremity pain  PSYCHIATRIC: No depression, anxiety, mood swings, or difficulty sleeping  HEME/LYMPH: No easy bruising, or bleeding gums  ALLERGY AND IMMUNOLOGIC: No hives or eczema    RADIOLOGY & ADDITIONAL TESTS:    Imaging Personally Reviewed:  [ ] YES  [ ] NO    Consultant(s) Notes Reviewed:  [ ] YES  [ ] NO    PHYSICAL EXAM:  GENERAL: NAD, well-groomed, well-developed  HEAD: Atraumatic, Normocephalic  EYES: EOMI, PERRLA, conjunctiva and sclera clear  ENMT: No tonsillar erythema, exudates, or enlargement; Moist mucous membranes, Good dentition, No lesions  NECK: Supple, No JVD, Normal thyroid  NERVOUS SYSTEM: Alert & Oriented X3, Good concentration; Motor strength 5/5 B/L upper and lower extremities; DTRs 2+ intact and symmetric  CHEST/LUNG: Clear to percussion bilaterally; No rales, rhonchi, wheezing, or rubs  HEART: Regular rate and rhythm; No murmurs, rubs, or gallops  ABDOMEN: Soft, Nontender, Nondistended; Bowel sounds present  EXTREMITIES: 2+ Peripheral Pulses, No clubbing, cyanosis, or edema  LYMPH: No lymphadenopathy noted  SKIN: No rashes or lesions    Care Discussed with Consultants/Other Providers [ ] YES  [ ] NO Patient is a 50y old  Male who presents with a chief complaint of PE (06 Jun 2019 07:07)      50 year old male with PMH of  HTN and DM2, who presented with Perforated Antral Gastric Ulcer, s/p Emergent Exp-lap Oomentopexy and plication 5/11, Proxismal afib,  with elevated troponin post-operatively indicative of NSTEMI, started on Amiodarone and converted to sinus rhythm,  left foot osteo s/p debridement 5/28, now presenting for CP and dyspnea x 1 day. Admitted for sub segmental PE RUL. on Heparin     Interval HPI/Overnight events: . Pt seen and examined at bedside.  Patient complained of chest pain this morning, substernal 4/10, while walking around his room, non responsive to 3 nitro and . Gave Morphine 2 mg.       T(C): 36.8 (06-06-19 @ 07:19), Max: 38.2 (06-06-19 @ 03:39)  HR: 55 (06-06-19 @ 07:19) (55 - 90)  BP: 119/77 (06-06-19 @ 07:19) (115/69 - 160/75)  RR: 17 (06-06-19 @ 07:19) (16 - 18)  SpO2: 96% (06-06-19 @ 07:19) (94% - 97%)  Wt(kg): --    I&O's Summary    05 Jun 2019 07:01  -  06 Jun 2019 07:00  --------------------------------------------------------  IN: 570 mL / OUT: 0 mL / NET: 570 mL    06 Jun 2019 07:01  -  06 Jun 2019 08:22  --------------------------------------------------------  IN: 15 mL / OUT: 0 mL / NET: 15 mL        LABS:                        8.4    6.71  )-----------( 306      ( 06 Jun 2019 07:54 )             26.7     06-06    144  |  111<H>  |  15  ----------------------------<  193<H>  3.7   |  26  |  1.30    Ca    7.9<L>      06 Jun 2019 05:05    TPro  6.7  /  Alb  2.9<L>  /  TBili  0.2  /  DBili  x   /  AST  18  /  ALT  17  /  AlkPhos  118  06-05    PT/INR - ( 06 Jun 2019 01:27 )   PT: 12.8 sec;   INR: 1.13 ratio         PTT - ( 06 Jun 2019 07:54 )  PTT:63.1 sec  CAPILLARY BLOOD GLUCOSE      POCT Blood Glucose.: 208 mg/dL (06 Jun 2019 07:44)  POCT Blood Glucose.: 106 mg/dL (05 Jun 2019 11:41)             MEDICATIONS  (STANDING):  amiodarone    Tablet 200 milliGRAM(s) Oral daily  dextrose 5%. 1000 milliLiter(s) (50 mL/Hr) IV Continuous <Continuous>  dextrose 50% Injectable 12.5 Gram(s) IV Push once  dextrose 50% Injectable 25 Gram(s) IV Push once  dextrose 50% Injectable 25 Gram(s) IV Push once  docusate sodium 100 milliGRAM(s) Oral three times a day  heparin  Infusion.  Unit(s)/Hr (15 mL/Hr) IV Continuous <Continuous>  insulin glargine Injectable (LANTUS) 10 Unit(s) SubCutaneous at bedtime  insulin lispro (HumaLOG) corrective regimen sliding scale   SubCutaneous three times a day before meals  insulin lispro (HumaLOG) corrective regimen sliding scale   SubCutaneous at bedtime  insulin lispro Injectable (HumaLOG) 3 Unit(s) SubCutaneous three times a day before meals  nicotine - 21 mG/24Hr(s) Patch 1 patch Transdermal daily  pantoprazole    Tablet 40 milliGRAM(s) Oral two times a day  polyethylene glycol 3350 17 Gram(s) Oral at bedtime  senna 2 Tablet(s) Oral at bedtime  tamsulosin 0.4 milliGRAM(s) Oral at bedtime    MEDICATIONS  (PRN):  ALBUTerol    90 MICROgram(s) HFA Inhaler 2 Puff(s) Inhalation every 6 hours PRN Shortness of Breath and/or Wheezing  dextrose 40% Gel 15 Gram(s) Oral once PRN Blood Glucose LESS THAN 70 milliGRAM(s)/deciliter  glucagon  Injectable 1 milliGRAM(s) IntraMuscular once PRN Glucose LESS THAN 70 milligrams/deciliter  heparin  Injectable 6500 Unit(s) IV Push every 6 hours PRN For aPTT less than 40  heparin  Injectable 3000 Unit(s) IV Push every 6 hours PRN For aPTT between 40 - 57      REVIEW OF SYSTEMS:  CONSTITUTIONAL: No fever, weight loss, or fatigue  EYES: No eye pain, visual disturbances, or discharge  ENMT: No difficulty hearing, tinnitus, vertigo; No sinus or throat pain  NECK: No pain or stiffness  RESPIRATORY: No cough, wheezing, chills or hemoptysis; No shortness of breath  CARDIOVASCULAR: No chest pain, palpitations, dizziness, or leg swelling  GASTROINTESTINAL: No abdominal or epigastric pain. No nausea, vomiting, or hematemesis; No diarrhea or constipation; No melena or hematochezia  GENITOURINARY: No dysuria, frequency, hematuria, or incontinence  NEUROLOGICAL: No headaches, memory loss, loss of strength, numbness, or tremors  SKIN: No itching, burning, rashes, or lesions   LYMPH NODES: No enlarged glands  ENDOCRINE: No heat or cold intolerance; No hair loss  MUSCULOSKELETAL: No joint pain or swelling; No muscle, back, or extremity pain  PSYCHIATRIC: No depression, anxiety, mood swings, or difficulty sleeping  HEME/LYMPH: No easy bruising, or bleeding gums  ALLERGY AND IMMUNOLOGIC: No hives or eczema    RADIOLOGY & ADDITIONAL TESTS:    Imaging Personally Reviewed:  [ ] YES  [ ] NO    Consultant(s) Notes Reviewed:  [ ] YES  [ ] NO    PHYSICAL EXAM:  GENERAL: NAD, well-groomed, well-developed  HEAD: Atraumatic, Normocephalic  EYES: EOMI, PERRLA, conjunctiva and sclera clear  ENMT: No tonsillar erythema, exudates, or enlargement; Moist mucous membranes, Good dentition, No lesions  NECK: Supple, No JVD, Normal thyroid  NERVOUS SYSTEM: Alert & Oriented X3, Good concentration; Motor strength 5/5 B/L upper and lower extremities; DTRs 2+ intact and symmetric  CHEST/LUNG: Clear to percussion bilaterally; No rales, rhonchi, wheezing, or rubs  HEART: Regular rate and rhythm; No murmurs, rubs, or gallops  ABDOMEN: Soft, Nontender, Nondistended; Bowel sounds present  EXTREMITIES: 2+ Peripheral Pulses, No clubbing, cyanosis, or edema  LYMPH: No lymphadenopathy noted  SKIN: No rashes or lesions    Care Discussed with Consultants/Other Providers [ ] YES  [ ] NO no

## 2024-03-29 NOTE — CONSULT NOTE ADULT - SUBJECTIVE AND OBJECTIVE BOX
Patient is a 49y old  Male who presents with a chief complaint of     HPI:    49-year-old male Pakistani-speaking brought in by hospital security and EMS for agitated behavior outside of the hospital.  Patient was aggressive requiring 4-point restraints chemical sedation.  Patient without signs of trauma.  ABCs intact.  Vitally stable.  Patient reportedly stated that he was drinking a lot of alcohol prior to coming into the ER.  Reportedly 1 pint of alcohol every day never stopped 2 days ago.  Patient given 5 of Haldol to Ativan and 5 of Versed 10 of IV Valium.      PAST MEDICAL & SURGICAL HISTORY:      SOCIAL HX:   Smoking                         ETOH                            Other    FAMILY HISTORY:  :  No known cardiovacular family hisotry     Review Of Systems:     All ROS are negative except per HPI       Allergies    Allergy Status Unknown    Intolerances          PHYSICAL EXAM    ICU Vital Signs Last 24 Hrs  T(C): 35.7 (29 Mar 2024 07:46), Max: 35.7 (29 Mar 2024 07:46)  T(F): 96.2 (29 Mar 2024 07:46), Max: 96.2 (29 Mar 2024 07:46)  HR: 79 (29 Mar 2024 07:46) (59 - 125)  BP: 162/99 (29 Mar 2024 07:46) (96/58 - 162/99)  BP(mean): 123 (29 Mar 2024 07:46) (123 - 123)  ABP: --  ABP(mean): --  RR: 18 (29 Mar 2024 07:46) (16 - 24)  SpO2: 100% (29 Mar 2024 07:46) (95% - 100%)    O2 Parameters below as of 29 Mar 2024 07:46  Patient On (Oxygen Delivery Method): nasal cannula  O2 Flow (L/min): 2          CONSTITUTIONAL:  Well nourished.   NAD    ENT:   Airway patent,   Mouth with normal mucosa.   No thrush      CARDIAC:   Normal rate,   Regular rhythm.    No edema      Vascular:   normal systolic impulse  no bruits    RESPIRATORY:   No wheezing  Bilateral BS   Not tachypneic,  No use of accessory muscles    GASTROINTESTINAL:  Abdomen soft,   Non-tender,   No guarding,   + BS      NEUROLOGICAL:   Alert and oriented   No motor deficits.    SKIN:   Skin normal color for race,   No evidence of rash.              LABS:                          17.4   19.01 )-----------( 235      ( 29 Mar 2024 01:42 )             48.3                                                   141  |  96<L>  |  14  ----------------------------<  269<H>  2.9<L>   |  13<L>  |  1.3    Ca    9.7      29 Mar 2024 01:42  Mg     2.3         TPro  6.7  /  Alb  4.7  /  TBili  1.1  /  DBili  x   /  AST  17  /  ALT  24  /  AlkPhos  87                                               Urinalysis Basic - ( 29 Mar 2024 03:45 )    Color: Yellow / Appearance: Clear / S.009 / pH: x  Gluc: x / Ketone: Negative mg/dL  / Bili: Negative / Urobili: 1.0 mg/dL   Blood: x / Protein: Negative mg/dL / Nitrite: Negative   Leuk Esterase: Negative / RBC: x / WBC x   Sq Epi: x / Non Sq Epi: x / Bacteria: x        CARDIAC MARKERS ( 29 Mar 2024 01:42 )  x     / x     / 107 U/L / x     / x                                                LIVER FUNCTIONS - ( 29 Mar 2024 01:42 )  Alb: 4.7 g/dL / Pro: 6.7 g/dL / ALK PHOS: 87 U/L / ALT: 24 U/L / AST: 17 U/L / GGT: x                                                                                                                                       X-Rays reviewed                                                                                     ECHO    CXR interpreted by me     MEDICATIONS  (STANDING):  dextrose 5% + sodium chloride 0.9%. 1000 milliLiter(s) (150 mL/Hr) IV Continuous <Continuous>  potassium chloride    Tablet ER 20 milliEquivalent(s) Oral once    MEDICATIONS  (PRN):         Patient is a 49y old  Male who presents with a chief complaint of     HPI:    49-year-old male American-speaking brought in by hospital security and EMS for agitated behavior outside of the hospital.  Patient was aggressive requiring 4-point restraints chemical sedation.  Patient without signs of trauma.  ABCs intact.  Vitally stable.  Patient reportedly stated that he was drinking a lot of alcohol prior to coming into the ER.  Reportedly 1 pint of alcohol every day never stopped 2 days ago.  Patient given 5 of Haldol to Ativan and 5 of Versed 10 of IV Valium.      PAST MEDICAL & SURGICAL HISTORY:      SOCIAL HX:   Smoking          denies               ETOH           Stated to drink heavily by friend in ED, patient denies                 Other    FAMILY HISTORY:  :  No known cardiovacular family hisotry     Review Of Systems:     All ROS are negative except per HPI       Allergies    Allergy Status Unknown    Intolerances          PHYSICAL EXAM    ICU Vital Signs Last 24 Hrs  T(C): 35.7 (29 Mar 2024 07:46), Max: 35.7 (29 Mar 2024 07:46)  T(F): 96.2 (29 Mar 2024 07:46), Max: 96.2 (29 Mar 2024 07:46)  HR: 79 (29 Mar 2024 07:46) (59 - 125)  BP: 162/99 (29 Mar 2024 07:46) (96/58 - 162/99)  BP(mean): 123 (29 Mar 2024 07:46) (123 - 123)  ABP: --  ABP(mean): --  RR: 18 (29 Mar 2024 07:46) (16 - 24)  SpO2: 100% (29 Mar 2024 07:46) (95% - 100%)    O2 Parameters below as of 29 Mar 2024 07:46  Patient On (Oxygen Delivery Method): nasal cannula  O2 Flow (L/min): 2          CONSTITUTIONAL:  Well nourished.   NAD    ENT:   Airway patent,   Mouth with normal mucosa.   No thrush      CARDIAC:   Normal rate,   Regular rhythm.    No edema      Vascular:   normal systolic impulse  no bruits    RESPIRATORY:   No wheezing  Bilateral BS   Not tachypneic,  No use of accessory muscles    GASTROINTESTINAL:  Abdomen soft,   Non-tender,   No guarding,   + BS      NEUROLOGICAL:   Alert and oriented   No motor deficits.    SKIN:   Skin normal color for race,   No evidence of rash.              LABS:                          17.4   19.01 )-----------( 235      ( 29 Mar 2024 01:42 )             48.3                                                   141  |  96<L>  |  14  ----------------------------<  269<H>  2.9<L>   |  13<L>  |  1.3    Ca    9.7      29 Mar 2024 01:42  Mg     2.3         TPro  6.7  /  Alb  4.7  /  TBili  1.1  /  DBili  x   /  AST  17  /  ALT  24  /  AlkPhos  87                                               Urinalysis Basic - ( 29 Mar 2024 03:45 )    Color: Yellow / Appearance: Clear / S.009 / pH: x  Gluc: x / Ketone: Negative mg/dL  / Bili: Negative / Urobili: 1.0 mg/dL   Blood: x / Protein: Negative mg/dL / Nitrite: Negative   Leuk Esterase: Negative / RBC: x / WBC x   Sq Epi: x / Non Sq Epi: x / Bacteria: x        CARDIAC MARKERS ( 29 Mar 2024 01:42 )  x     / x     / 107 U/L / x     / x                                                LIVER FUNCTIONS - ( 29 Mar 2024 01:42 )  Alb: 4.7 g/dL / Pro: 6.7 g/dL / ALK PHOS: 87 U/L / ALT: 24 U/L / AST: 17 U/L / GGT: x                                                                                                                                       X-Rays reviewed                                                                                     ECHO    CXR interpreted by me     MEDICATIONS  (STANDING):  dextrose 5% + sodium chloride 0.9%. 1000 milliLiter(s) (150 mL/Hr) IV Continuous <Continuous>  potassium chloride    Tablet ER 20 milliEquivalent(s) Oral once    MEDICATIONS  (PRN):

## 2024-03-30 LAB
ALBUMIN SERPL ELPH-MCNC: 4.6 G/DL — SIGNIFICANT CHANGE UP (ref 3.5–5.2)
ALP SERPL-CCNC: 83 U/L — SIGNIFICANT CHANGE UP (ref 30–115)
ALT FLD-CCNC: 27 U/L — SIGNIFICANT CHANGE UP (ref 0–41)
ANION GAP SERPL CALC-SCNC: 11 MMOL/L — SIGNIFICANT CHANGE UP (ref 7–14)
AST SERPL-CCNC: 28 U/L — SIGNIFICANT CHANGE UP (ref 0–41)
BILIRUB SERPL-MCNC: 1.1 MG/DL — SIGNIFICANT CHANGE UP (ref 0.2–1.2)
BUN SERPL-MCNC: 7 MG/DL — LOW (ref 10–20)
CALCIUM SERPL-MCNC: 9.4 MG/DL — SIGNIFICANT CHANGE UP (ref 8.4–10.5)
CHLORIDE SERPL-SCNC: 104 MMOL/L — SIGNIFICANT CHANGE UP (ref 98–110)
CO2 SERPL-SCNC: 26 MMOL/L — SIGNIFICANT CHANGE UP (ref 17–32)
CREAT SERPL-MCNC: 1.1 MG/DL — SIGNIFICANT CHANGE UP (ref 0.7–1.5)
CULTURE RESULTS: NO GROWTH — SIGNIFICANT CHANGE UP
EGFR: 82 ML/MIN/1.73M2 — SIGNIFICANT CHANGE UP
GLUCOSE SERPL-MCNC: 142 MG/DL — HIGH (ref 70–99)
HCT VFR BLD CALC: 44.4 % — SIGNIFICANT CHANGE UP (ref 42–52)
HGB BLD-MCNC: 16.3 G/DL — SIGNIFICANT CHANGE UP (ref 14–18)
MAGNESIUM SERPL-MCNC: 2.3 MG/DL — SIGNIFICANT CHANGE UP (ref 1.8–2.4)
MCHC RBC-ENTMCNC: 31.8 PG — HIGH (ref 27–31)
MCHC RBC-ENTMCNC: 36.7 G/DL — SIGNIFICANT CHANGE UP (ref 32–37)
MCV RBC AUTO: 86.5 FL — SIGNIFICANT CHANGE UP (ref 80–94)
NRBC # BLD: 0 /100 WBCS — SIGNIFICANT CHANGE UP (ref 0–0)
PHOSPHATE SERPL-MCNC: 3 MG/DL — SIGNIFICANT CHANGE UP (ref 2.1–4.9)
PLATELET # BLD AUTO: 209 K/UL — SIGNIFICANT CHANGE UP (ref 130–400)
PMV BLD: 11.8 FL — HIGH (ref 7.4–10.4)
POTASSIUM SERPL-MCNC: 3.9 MMOL/L — SIGNIFICANT CHANGE UP (ref 3.5–5)
POTASSIUM SERPL-SCNC: 3.9 MMOL/L — SIGNIFICANT CHANGE UP (ref 3.5–5)
PROT SERPL-MCNC: 7 G/DL — SIGNIFICANT CHANGE UP (ref 6–8)
RBC # BLD: 5.13 M/UL — SIGNIFICANT CHANGE UP (ref 4.7–6.1)
RBC # FLD: 11.7 % — SIGNIFICANT CHANGE UP (ref 11.5–14.5)
SODIUM SERPL-SCNC: 141 MMOL/L — SIGNIFICANT CHANGE UP (ref 135–146)
SPECIMEN SOURCE: SIGNIFICANT CHANGE UP
WBC # BLD: 8.88 K/UL — SIGNIFICANT CHANGE UP (ref 4.8–10.8)
WBC # FLD AUTO: 8.88 K/UL — SIGNIFICANT CHANGE UP (ref 4.8–10.8)

## 2024-03-30 PROCEDURE — 99232 SBSQ HOSP IP/OBS MODERATE 35: CPT | Mod: GC

## 2024-03-30 PROCEDURE — 99232 SBSQ HOSP IP/OBS MODERATE 35: CPT

## 2024-03-30 PROCEDURE — 99233 SBSQ HOSP IP/OBS HIGH 50: CPT

## 2024-03-30 RX ORDER — CHLORHEXIDINE GLUCONATE 213 G/1000ML
1 SOLUTION TOPICAL DAILY
Refills: 0 | Status: DISCONTINUED | OUTPATIENT
Start: 2024-03-30 | End: 2024-04-09

## 2024-03-30 RX ORDER — NICOTINE POLACRILEX 2 MG
1 GUM BUCCAL DAILY
Refills: 0 | Status: DISCONTINUED | OUTPATIENT
Start: 2024-03-30 | End: 2024-04-09

## 2024-03-30 RX ADMIN — Medication 1 PATCH: at 23:22

## 2024-03-30 RX ADMIN — CHLORHEXIDINE GLUCONATE 1 APPLICATION(S): 213 SOLUTION TOPICAL at 11:59

## 2024-03-30 RX ADMIN — Medication 1 PATCH: at 07:53

## 2024-03-30 RX ADMIN — Medication 25 MILLIGRAM(S): at 13:11

## 2024-03-30 RX ADMIN — SODIUM CHLORIDE 125 MILLILITER(S): 9 INJECTION, SOLUTION INTRAVENOUS at 00:01

## 2024-03-30 RX ADMIN — Medication 1 PATCH: at 20:07

## 2024-03-30 RX ADMIN — Medication 2 MILLIGRAM(S): at 08:49

## 2024-03-30 RX ADMIN — Medication 2 MILLIGRAM(S): at 23:44

## 2024-03-30 RX ADMIN — Medication 100 MILLIGRAM(S): at 11:59

## 2024-03-30 RX ADMIN — Medication 25 MILLIGRAM(S): at 23:07

## 2024-03-30 RX ADMIN — Medication 1 TABLET(S): at 11:59

## 2024-03-30 RX ADMIN — Medication 1 MILLIGRAM(S): at 11:59

## 2024-03-30 RX ADMIN — Medication 1 PATCH: at 12:00

## 2024-03-30 RX ADMIN — Medication 25 MILLIGRAM(S): at 06:01

## 2024-03-30 NOTE — BH CONSULTATION LIAISON ASSESSMENT NOTE - NSBHATTESTCOMMENTATTENDFT_PSY_A_CORE
Interviewed patient with the resident Dr. Sherwood. Patient presents well related, A,Ox3, although still slightly perplexed and tangential. He denies feeling depressed, and denies SI. Likely Dx is alcohol use d/o, delirium tremens, currently resolving. Agree with resident's assessment and plan. Recommend to continue 1:1 observation today due to risk of "sundowning" and unpredictable behavior. No need for acute psychiatric intervention or IPP admission at this time.

## 2024-03-30 NOTE — BH CONSULTATION LIAISON ASSESSMENT NOTE - HPI (INCLUDE ILLNESS QUALITY, SEVERITY, DURATION, TIMING, CONTEXT, MODIFYING FACTORS, ASSOCIATED SIGNS AND SYMPTOMS)
Javier Kee is a 49-year-old male, Tuvaluan speaking, domiciled with his son in [place], past medical history of hypertension (self-reported), no formal past psychiatric history, no known history of suicide attempts or inpatient psychiatric hospitalizations, no formal past substance use history, denies history of detox admissions or rehab stays, admitted to medicine for alcohol withdrawal. Psychiatry and Addiction Medicine consulted for suicidal ideation and alcohol abuse.     As per medicine HPI, patient "brought in by hospital security and EMS for agitated behavior outside of the hospital.  Patient was aggressive in ED requiring 4-point restraints chemical sedation.  Patient reportedly stated to EMS that he was drinking a lot of alcohol prior to coming into the ER.  Reportedly 1 pint of alcohol every day never stopped 2 days ago.  Patient currently denies abusing alcohol stating he drinks 1 bottle of beer a month. Patient states last drink was about 1 week ago. Patient denies hx of tremors or seizures. Patient reports visual hallucinations in dreams. Patient reported suicidal ideations in ED but currently denies any."    Spoke with nurse caring for patient, who has been able to speak with patient in his native language. Informed that patient has been calm, cooperative today, requested PRN medication since he reported not feeling well and responded well to Ativan, has demonstrated intermittent full orientation and mild restlessness today, but overall as compared to first arriving in CCU is presenting more lucid. Nurse reports patient endorsed he began drinking 0.5 liters of vodka daily and stopped 2 days ago. Nurse states that patient endorsed not remembering coming to the hospital initially.     Patient was seen and evaluated. Patient was calm, cooperative, answered questions for the most part appropriately, although at times answered questions with some tangentiality. He states he does not have full memory of coming to the hospital, but remember being agitated and having hallucinations. States he had been drinking daily and stopped 2 days ago. States today, with the current medication taper, he is feeling much better. Denies depressed mood or having suicidal ideation. He denies ever attem Javier Kee is a 49-year-old male, Ghanaian speaking, domiciled with his son in [place], past medical history of hypertension (self-reported), no formal past psychiatric history, no known history of suicide attempts or inpatient psychiatric hospitalizations, no formal past substance use history, denies history of detox admissions or rehab stays, admitted to medicine for alcohol withdrawal. Psychiatry and Addiction Medicine consulted for suicidal ideation and alcohol abuse.     As per medicine HPI, patient "brought in by hospital security and EMS for agitated behavior outside of the hospital.  Patient was aggressive in ED requiring 4-point restraints chemical sedation.  Patient reportedly stated to EMS that he was drinking a lot of alcohol prior to coming into the ER.  Reportedly 1 pint of alcohol every day never stopped 2 days ago.  Patient currently denies abusing alcohol stating he drinks 1 bottle of beer a month. Patient states last drink was about 1 week ago. Patient denies hx of tremors or seizures. Patient reports visual hallucinations in dreams. Patient reported suicidal ideations in ED but currently denies any."    Spoke with nurse caring for patient, who has been able to speak with patient in his native language. Informed that patient has been calm, cooperative today, requested PRN medication since he reported not feeling well and responded well to Ativan, has demonstrated intermittent full orientation and mild restlessness today, but overall as compared to first arriving in CCU is presenting more lucid. Nurse reports patient endorsed he began drinking 0.5 liters of vodka daily and stopped 2 days ago. Nurse states that patient endorsed not remembering coming to the hospital initially. Also reports patient has been in the [place] for the past few months with his son for [employer] but plans to return to [place] now that their visa has .    Patient was seen and evaluated. Patient was calm, cooperative, answered questions for the most part appropriately, although at times answered questions with some tangentiality. He states he does not have full memory of coming to the hospital, but remember being agitated and having hallucinations. States he had been drinking daily and stopped 2 days ago. States today, with the current medication taper, he is feeling much better. Denies depressed mood or having suicidal ideation. He denies ever attempting suicide. He denies symptoms consistent with adelaida/hypomania or visual/auditory hallucinations. Denies ever seeing a psychiatrist previously or being admitted to an inpatient psychiatric unit. Regarding substance use, he endorses he previously quit smoking cigarettes, occasionally smokes cigars, and denies any cannabis or illicit substance use. States he feels safe going home, and his plans after discharge are to move back in with his son and eventually return home to [place].

## 2024-03-30 NOTE — BH CONSULTATION LIAISON ASSESSMENT NOTE - TELEPSYCHIATRY?
-Please treat the area for bed bugs    -The itching for bed bugs can last for 2-3 weeks even after the bites stop. You can use the topical medication two times a day as needed for up to 2 weeks to help with itch.    -You can use the medication by mouth every 8 hours as needed for itching but it might make you tired    FREE OR LOW COST DENTAL SERVICES    [facility] Department  [address] [place] [address]  [phone]  [email or web address]  Program is limited to [place] and [place] residents only.  Services are limited to screenings, education, and a school-based sealant program.    [facility]  [address] [place] [address]  [phone] English  [phone] Espanol  Accepts Title XIX and HMO patients.  Half the cost of a regular dentist.    [facility]  [address] [place] [address]  [phone]  Services available to residents of [facility].    [facility]  [address], [place] [address]  [phone]  Serves the uninsured in the [facility].  Must have a Social Security number, photo ID, and proof of income.    [facility] Location  [address] [place] [address]  [phone]  Accepts Title XIX and HMO.  Charges uninsured based on family size and income.    [facility]  [address] [place] [address]  [phone]  Takes Title XIX and HMO.  If not insured, bring check stub and pay up front.  No credit cards.    [facility]  [address]  [place] [address] [phone]  Provides access to affordable dental care for low-income [facility] residents.  Patients must have no dental insurance and have income that falls between the 100% and 200% of the federal poverty guidelines and must be currently enrolled in  [employer] Plus or Medicaid Title 19.    [facility]  Available in [place], [place], and [facility].  [phone]  Janessa Grimm  Only for extreme dental purposes, not intended for routine practices such as cleanings.  People who may be eligible must be permanently disabled, chronically ill, or 65 years or older.  There is an application and screening process.       No

## 2024-03-30 NOTE — BH CONSULTATION LIAISON ASSESSMENT NOTE - NSBHCHARTREVIEWLAB_PSY_A_CORE FT
16.3   8.88  )-----------( 209      ( 30 Mar 2024 05:56 )             44.4     03-30    141  |  104  |  7<L>  ----------------------------<  142<H>  3.9   |  26  |  1.1    Ca    9.4      30 Mar 2024 05:56  Phos  3.0     03-30  Mg     2.3     03-30    TPro  7.0  /  Alb  4.6  /  TBili  1.1  /  DBili  x   /  AST  28  /  ALT  27  /  AlkPhos  83  03-30

## 2024-03-30 NOTE — BH CONSULTATION LIAISON ASSESSMENT NOTE - CURRENT MEDICATION
MEDICATIONS  (STANDING):  chlordiazePOXIDE 25 milliGRAM(s) Oral every 8 hours  chlorhexidine 2% Cloths 1 Application(s) Topical daily  chlorhexidine 4% Liquid 1 Application(s) Topical <User Schedule>  dextrose 5% + sodium chloride 0.45%. 1000 milliLiter(s) (125 mL/Hr) IV Continuous <Continuous>  enoxaparin Injectable 40 milliGRAM(s) SubCutaneous every 24 hours  folic acid 1 milliGRAM(s) Oral daily  multivitamin 1 Tablet(s) Oral daily  nicotine - 21 mG/24Hr(s) Patch 1 Patch Transdermal daily  thiamine 100 milliGRAM(s) Oral daily    MEDICATIONS  (PRN):  acetaminophen     Tablet .. 650 milliGRAM(s) Oral every 6 hours PRN Temp greater or equal to 38C (100.4F), Mild Pain (1 - 3)  aluminum hydroxide/magnesium hydroxide/simethicone Suspension 30 milliLiter(s) Oral every 4 hours PRN Dyspepsia  LORazepam     Tablet 2 milliGRAM(s) Oral every 2 hours PRN Symptom-triggered 2 point increase in CIWA-Ar  LORazepam   Injectable 2 milliGRAM(s) IV Push every 1 hour PRN Symptom-triggered: each CIWA -Ar score 8 or GREATER  melatonin 3 milliGRAM(s) Oral at bedtime PRN Insomnia  ondansetron Injectable 4 milliGRAM(s) IV Push every 8 hours PRN Nausea and/or Vomiting

## 2024-03-30 NOTE — BH CONSULTATION LIAISON ASSESSMENT NOTE - RISK ASSESSMENT
Risk factors for suicide - alcohol abuse, male sex. Protective factors against suicide - future oriented, no prior suicide attempts, stable mood.

## 2024-03-30 NOTE — BH CONSULTATION LIAISON ASSESSMENT NOTE - DETAILS
Patient states many years ago the thought of, "Maybe I should not be around," came to his mind, but he denies ever experiencing thoughts of wanting to end his own life. Denies ever attempting suicide or engaging in self-harm.

## 2024-03-30 NOTE — PROGRESS NOTE ADULT - ASSESSMENT
IMPRESSION:  early DTs  Altered mental status   EtOH withdrawal   HAGMA possibly AKA vs DKA?  Suicidal ideation    PLAN:    CNS: CIWA symptom driven only, no standing taper. Thiamine. Folate. UDS positive for BZD, possibly. CTH noted. Psych follow up. If uncontrolled, may need precedex. 1:1 until cleared by psych.     HEENT: Oral care    PULMONARY:  HOB @ 45 degrees. CXR noted, no infiltrate. On 2L O2, wean O2 as tolerated, target SpO2 >92%.     CARDIOVASCULAR: Check TTE. OK tod/c D5NS.     GI: GI prophylaxis.  Feeding when awake. S+S eval.     RENAL:  Follow up lytes.  Correct as needed. Monitor Cr, no known baseline. Repeat BMP in PM.     INFECTIOUS DISEASE: Follow up cultures. UA negative. procal    HEMATOLOGICAL:  DVT prophylaxis.    ENDOCRINE:  Follow up FS.  Insulin protocol if needed. Check B-OH.     MUSCULOSKELETAL: Bed rest.     GMF

## 2024-03-30 NOTE — PROGRESS NOTE ADULT - ASSESSMENT
#ETOH dependence - CATCH, addiction med  #ETOH WD - on librium standing , ativan prn ciwa  #thiamine and foalte deficiency - on repletion   #HAGMA - resolved, cont ivfs  #metabolic encephalopathy due to above- CTH neg, UDS neg  #MDD, Suicidal ideation- 1:1 sit, spoke with Dr dominguez on call psych to eval if able to answer questions  discussed with icu team    #ETOH dependence - CATCH, addiction med  #ETOH WD - on librium standing , ativan prn ciwa  #thiamine and foalte deficiency - on repletion   #HAGMA - resolved, cont ivfs  #metabolic encephalopathy due to above- CTH neg, UDS neg- resolved - aaox3 , speaking clearly - bedside VITOR carter translated in Mozambican  #MDD, Suicidal ideation- 1:1 sit, spoke with Dr dominguez on call psych to eval   discussed with icu team

## 2024-03-30 NOTE — BH CONSULTATION LIAISON ASSESSMENT NOTE - NSBHCHARTREVIEWINVESTIGATE_PSY_A_CORE FT
independent
< from: 12 Lead ECG (03.29.24 @ 04:48) >    Ventricular Rate 55 BPM    Atrial Rate 55 BPM    P-R Interval 212 ms    QRS Duration 88 ms    Q-T Interval 452 ms    QTC Calculation(Bazett) 432 ms    P Axis 35 degrees    R Axis 56 degrees    T Axis 58 degrees    Diagnosis Line Sinus bradycardia with 1st degree A-V block  Otherwise normal ECG    < end of copied text >

## 2024-03-30 NOTE — BH CONSULTATION LIAISON ASSESSMENT NOTE - NSBHCONSULTFOLLOWAFTERCARE_PSY_A_CORE FT
-  Patient can be referred to outpatient services for dual diagnosis for management of substance use disorders, supportive psychotherapy, and medication management, if agreeable:  [facility]  [address] [place] [id number]  [phone]

## 2024-03-30 NOTE — BH CONSULTATION LIAISON ASSESSMENT NOTE - NSBHCHARTREVIEWVS_PSY_A_CORE FT
Vital Signs Last 24 Hrs  T(C): 36.2 (30 Mar 2024 07:00), Max: 36.5 (29 Mar 2024 23:33)  T(F): 97.1 (30 Mar 2024 07:00), Max: 97.7 (29 Mar 2024 23:33)  HR: 83 (30 Mar 2024 07:00) (60 - 97)  BP: 166/95 (30 Mar 2024 07:00) (108/60 - 179/105)  BP(mean): 113 (30 Mar 2024 05:44) (77 - 136)  RR: 18 (30 Mar 2024 07:00) (14 - 20)  SpO2: 98% (29 Mar 2024 23:33) (96% - 98%)    Parameters below as of 29 Mar 2024 23:33  Patient On (Oxygen Delivery Method): room air

## 2024-03-30 NOTE — BH CONSULTATION LIAISON ASSESSMENT NOTE - SUMMARY
Javier Kee is a 49-year-old male, Nigerien speaking, domiciled with his son in [place], past medical history of hypertension (self-reported), no formal past psychiatric history, no known history of suicide attempts or inpatient psychiatric hospitalizations, no formal past substance use history, denies history of detox admissions or rehab stays, admitted to medicine for alcohol withdrawal. Psychiatry and Addiction Medicine consulted for suicidal ideation and alcohol abuse.     On psychiatric evaluation, patient is not acutely depressed, suicidal, psychotic, or manic. Patient's presentation upon arriving to the hospital - autonomic instability, agitation, hallucinations - were likely secondary to alcohol withdrawal. Patient is not acutely suicidal with intent or plan. Psychiatrically, there are no contraindications for discharge. Patient's presentation does not warrant inpatient psychiatric admission. Patient would benefit from continuing the current librium taper while monitoring for signs and symptoms of protracted withdrawal. Patient appears to still have waxing and waning mental status (e.g., disruptions in orientation) intermittently throughout the day that may worsen at night. Recommend to continue 1:1 Constant Observation for this reason.     RECOMMENDATIONS  - Continue Librium taper  - Continue to monitor for potential signs and symptoms of alcohol withdrawal  - No psychiatric contraindications for discharge

## 2024-03-30 NOTE — BH CONSULTATION LIAISON ASSESSMENT NOTE - NSBHINDICATION_PSY_ALL_CORE
Patient appears to still have waxing and waning mental status (e.g., disruptions in orientation) intermittently throughout the day that may worsen at night. Recommend to continue 1:1 Constant Observation for this reason.

## 2024-03-31 LAB
ALBUMIN SERPL ELPH-MCNC: 4.3 G/DL — SIGNIFICANT CHANGE UP (ref 3.5–5.2)
ALP SERPL-CCNC: 74 U/L — SIGNIFICANT CHANGE UP (ref 30–115)
ALT FLD-CCNC: 23 U/L — SIGNIFICANT CHANGE UP (ref 0–41)
ANION GAP SERPL CALC-SCNC: 12 MMOL/L — SIGNIFICANT CHANGE UP (ref 7–14)
AST SERPL-CCNC: 17 U/L — SIGNIFICANT CHANGE UP (ref 0–41)
BASOPHILS # BLD AUTO: 0.06 K/UL — SIGNIFICANT CHANGE UP (ref 0–0.2)
BASOPHILS NFR BLD AUTO: 0.6 % — SIGNIFICANT CHANGE UP (ref 0–1)
BILIRUB SERPL-MCNC: 1.1 MG/DL — SIGNIFICANT CHANGE UP (ref 0.2–1.2)
BUN SERPL-MCNC: 11 MG/DL — SIGNIFICANT CHANGE UP (ref 10–20)
CALCIUM SERPL-MCNC: 9.5 MG/DL — SIGNIFICANT CHANGE UP (ref 8.4–10.5)
CHLORIDE SERPL-SCNC: 103 MMOL/L — SIGNIFICANT CHANGE UP (ref 98–110)
CO2 SERPL-SCNC: 26 MMOL/L — SIGNIFICANT CHANGE UP (ref 17–32)
CREAT SERPL-MCNC: 1.2 MG/DL — SIGNIFICANT CHANGE UP (ref 0.7–1.5)
EGFR: 74 ML/MIN/1.73M2 — SIGNIFICANT CHANGE UP
EOSINOPHIL # BLD AUTO: 0.18 K/UL — SIGNIFICANT CHANGE UP (ref 0–0.7)
EOSINOPHIL NFR BLD AUTO: 1.9 % — SIGNIFICANT CHANGE UP (ref 0–8)
GLUCOSE BLDC GLUCOMTR-MCNC: 117 MG/DL — HIGH (ref 70–99)
GLUCOSE SERPL-MCNC: 119 MG/DL — HIGH (ref 70–99)
HCT VFR BLD CALC: 42.3 % — SIGNIFICANT CHANGE UP (ref 42–52)
HGB BLD-MCNC: 15.3 G/DL — SIGNIFICANT CHANGE UP (ref 14–18)
IMM GRANULOCYTES NFR BLD AUTO: 0.3 % — SIGNIFICANT CHANGE UP (ref 0.1–0.3)
LYMPHOCYTES # BLD AUTO: 2.54 K/UL — SIGNIFICANT CHANGE UP (ref 1.2–3.4)
LYMPHOCYTES # BLD AUTO: 27.3 % — SIGNIFICANT CHANGE UP (ref 20.5–51.1)
MAGNESIUM SERPL-MCNC: 2.1 MG/DL — SIGNIFICANT CHANGE UP (ref 1.8–2.4)
MCHC RBC-ENTMCNC: 31.4 PG — HIGH (ref 27–31)
MCHC RBC-ENTMCNC: 36.2 G/DL — SIGNIFICANT CHANGE UP (ref 32–37)
MCV RBC AUTO: 86.9 FL — SIGNIFICANT CHANGE UP (ref 80–94)
MONOCYTES # BLD AUTO: 0.67 K/UL — HIGH (ref 0.1–0.6)
MONOCYTES NFR BLD AUTO: 7.2 % — SIGNIFICANT CHANGE UP (ref 1.7–9.3)
NEUTROPHILS # BLD AUTO: 5.82 K/UL — SIGNIFICANT CHANGE UP (ref 1.4–6.5)
NEUTROPHILS NFR BLD AUTO: 62.7 % — SIGNIFICANT CHANGE UP (ref 42.2–75.2)
NRBC # BLD: 0 /100 WBCS — SIGNIFICANT CHANGE UP (ref 0–0)
PLATELET # BLD AUTO: 162 K/UL — SIGNIFICANT CHANGE UP (ref 130–400)
PMV BLD: 12.1 FL — HIGH (ref 7.4–10.4)
POTASSIUM SERPL-MCNC: 3.5 MMOL/L — SIGNIFICANT CHANGE UP (ref 3.5–5)
POTASSIUM SERPL-SCNC: 3.5 MMOL/L — SIGNIFICANT CHANGE UP (ref 3.5–5)
PROT SERPL-MCNC: 6 G/DL — SIGNIFICANT CHANGE UP (ref 6–8)
RBC # BLD: 4.87 M/UL — SIGNIFICANT CHANGE UP (ref 4.7–6.1)
RBC # FLD: 11.6 % — SIGNIFICANT CHANGE UP (ref 11.5–14.5)
SODIUM SERPL-SCNC: 141 MMOL/L — SIGNIFICANT CHANGE UP (ref 135–146)
WBC # BLD: 9.3 K/UL — SIGNIFICANT CHANGE UP (ref 4.8–10.8)
WBC # FLD AUTO: 9.3 K/UL — SIGNIFICANT CHANGE UP (ref 4.8–10.8)

## 2024-03-31 PROCEDURE — 99233 SBSQ HOSP IP/OBS HIGH 50: CPT

## 2024-03-31 RX ORDER — POTASSIUM CHLORIDE 20 MEQ
40 PACKET (EA) ORAL ONCE
Refills: 0 | Status: COMPLETED | OUTPATIENT
Start: 2024-03-31 | End: 2024-03-31

## 2024-03-31 RX ORDER — LABETALOL HCL 100 MG
10 TABLET ORAL ONCE
Refills: 0 | Status: COMPLETED | OUTPATIENT
Start: 2024-03-31 | End: 2024-03-31

## 2024-03-31 RX ORDER — CARVEDILOL PHOSPHATE 80 MG/1
12.5 CAPSULE, EXTENDED RELEASE ORAL EVERY 12 HOURS
Refills: 0 | Status: DISCONTINUED | OUTPATIENT
Start: 2024-03-31 | End: 2024-04-09

## 2024-03-31 RX ORDER — AMLODIPINE BESYLATE 2.5 MG/1
5 TABLET ORAL DAILY
Refills: 0 | Status: DISCONTINUED | OUTPATIENT
Start: 2024-03-31 | End: 2024-04-09

## 2024-03-31 RX ADMIN — Medication 1 PATCH: at 07:43

## 2024-03-31 RX ADMIN — Medication 25 MILLIGRAM(S): at 06:31

## 2024-03-31 RX ADMIN — Medication 40 MILLIEQUIVALENT(S): at 11:32

## 2024-03-31 RX ADMIN — CARVEDILOL PHOSPHATE 12.5 MILLIGRAM(S): 80 CAPSULE, EXTENDED RELEASE ORAL at 18:37

## 2024-03-31 RX ADMIN — Medication 25 MILLIGRAM(S): at 21:39

## 2024-03-31 RX ADMIN — AMLODIPINE BESYLATE 5 MILLIGRAM(S): 2.5 TABLET ORAL at 08:52

## 2024-03-31 RX ADMIN — Medication 2 MILLIGRAM(S): at 03:15

## 2024-03-31 RX ADMIN — Medication 100 MILLIGRAM(S): at 11:32

## 2024-03-31 RX ADMIN — CHLORHEXIDINE GLUCONATE 1 APPLICATION(S): 213 SOLUTION TOPICAL at 11:32

## 2024-03-31 RX ADMIN — Medication 1 PATCH: at 11:32

## 2024-03-31 RX ADMIN — Medication 1 TABLET(S): at 11:32

## 2024-03-31 RX ADMIN — Medication 1 MILLIGRAM(S): at 11:32

## 2024-03-31 RX ADMIN — Medication 2 MILLIGRAM(S): at 23:17

## 2024-03-31 RX ADMIN — Medication 10 MILLIGRAM(S): at 16:19

## 2024-03-31 RX ADMIN — Medication 1 PATCH: at 11:03

## 2024-03-31 RX ADMIN — ENOXAPARIN SODIUM 40 MILLIGRAM(S): 100 INJECTION SUBCUTANEOUS at 15:35

## 2024-03-31 RX ADMIN — Medication 1 PATCH: at 19:12

## 2024-03-31 RX ADMIN — Medication 25 MILLIGRAM(S): at 13:57

## 2024-03-31 NOTE — PROGRESS NOTE ADULT - ASSESSMENT
49-year-old Ugandan-speaking male brought in by hospital security and EMS for agitated behavior outside of the hospital.  Patient was aggressive in ED requiring 4-point restraints and chemical sedation.  Patient reportedly stated to EMS that he was drinking a lot of alcohol prior to coming into the ER.  Reportedly 1 pint of alcohol every day and stopped 2 days ago    alcohol withdrawal / alcoholic ketoacidosis - resolved / suicidal ideation / suspected thiamine and folate deficiency / hypokalemia     - continue benzo detox protocol   - supplement and follow repeat  lytes   - thiamine and folate   - continue 1:1 sit as per psych recommendations    - psych and detox following    - DVT prophylaxis.    50 minutes total spent today on patient care

## 2024-04-01 LAB
7AMINOCLONAZEPAM UR CFM-MCNC: NEGATIVE NG/ML — SIGNIFICANT CHANGE UP
ALBUMIN SERPL ELPH-MCNC: 4.1 G/DL — SIGNIFICANT CHANGE UP (ref 3.5–5.2)
ALP SERPL-CCNC: 71 U/L — SIGNIFICANT CHANGE UP (ref 30–115)
ALPHA-HYDROXYALPRAZOLAM, UR RESULT: NEGATIVE NG/ML — SIGNIFICANT CHANGE UP
ALPRAZ UR CFM-MCNC: NEGATIVE NG/ML — SIGNIFICANT CHANGE UP
ALT FLD-CCNC: 27 U/L — SIGNIFICANT CHANGE UP (ref 0–41)
ANION GAP SERPL CALC-SCNC: 12 MMOL/L — SIGNIFICANT CHANGE UP (ref 7–14)
AST SERPL-CCNC: 15 U/L — SIGNIFICANT CHANGE UP (ref 0–41)
BILIRUB SERPL-MCNC: 0.7 MG/DL — SIGNIFICANT CHANGE UP (ref 0.2–1.2)
BUN SERPL-MCNC: 12 MG/DL — SIGNIFICANT CHANGE UP (ref 10–20)
CALCIUM SERPL-MCNC: 9.4 MG/DL — SIGNIFICANT CHANGE UP (ref 8.4–10.5)
CHLORIDE SERPL-SCNC: 103 MMOL/L — SIGNIFICANT CHANGE UP (ref 98–110)
CLONAZEPAM UR CFM-MCNC: NEGATIVE NG/ML — SIGNIFICANT CHANGE UP
CO2 SERPL-SCNC: 25 MMOL/L — SIGNIFICANT CHANGE UP (ref 17–32)
CREAT SERPL-MCNC: 1.3 MG/DL — SIGNIFICANT CHANGE UP (ref 0.7–1.5)
DIAZEPAM UR CFM-MCNC: NEGATIVE NG/ML — SIGNIFICANT CHANGE UP
EGFR: 67 ML/MIN/1.73M2 — SIGNIFICANT CHANGE UP
FLUNITRAZEPAM UR CFM-MCNC: NEGATIVE NG/ML — SIGNIFICANT CHANGE UP
FLURAZEPAM UR CFM-MCNC: NEGATIVE NG/ML — SIGNIFICANT CHANGE UP
GLUCOSE SERPL-MCNC: 130 MG/DL — HIGH (ref 70–99)
HCT VFR BLD CALC: 40.3 % — LOW (ref 42–52)
HGB BLD-MCNC: 14.8 G/DL — SIGNIFICANT CHANGE UP (ref 14–18)
LORAZEPAM UR CFM-MCNC: NEGATIVE NG/ML — SIGNIFICANT CHANGE UP
MAGNESIUM SERPL-MCNC: 2.2 MG/DL — SIGNIFICANT CHANGE UP (ref 1.8–2.4)
MCHC RBC-ENTMCNC: 31.4 PG — HIGH (ref 27–31)
MCHC RBC-ENTMCNC: 36.7 G/DL — SIGNIFICANT CHANGE UP (ref 32–37)
MCV RBC AUTO: 85.4 FL — SIGNIFICANT CHANGE UP (ref 80–94)
MIDAZOLAM UR CFM-MCNC: NEGATIVE NG/ML — SIGNIFICANT CHANGE UP
NRBC # BLD: 0 /100 WBCS — SIGNIFICANT CHANGE UP (ref 0–0)
OXAZEPAM UR QL SCN: NEGATIVE NG/ML — SIGNIFICANT CHANGE UP
OXAZEPAM, UR RESULT: NEGATIVE NG/ML — SIGNIFICANT CHANGE UP
PLATELET # BLD AUTO: 172 K/UL — SIGNIFICANT CHANGE UP (ref 130–400)
PMV BLD: 12.1 FL — HIGH (ref 7.4–10.4)
POTASSIUM SERPL-MCNC: 3.7 MMOL/L — SIGNIFICANT CHANGE UP (ref 3.5–5)
POTASSIUM SERPL-SCNC: 3.7 MMOL/L — SIGNIFICANT CHANGE UP (ref 3.5–5)
PROT SERPL-MCNC: 5.8 G/DL — LOW (ref 6–8)
RBC # BLD: 4.72 M/UL — SIGNIFICANT CHANGE UP (ref 4.7–6.1)
RBC # FLD: 11.6 % — SIGNIFICANT CHANGE UP (ref 11.5–14.5)
SODIUM SERPL-SCNC: 140 MMOL/L — SIGNIFICANT CHANGE UP (ref 135–146)
TEMAZEPAM UR CFM-MCNC: NEGATIVE NG/ML — SIGNIFICANT CHANGE UP
WBC # BLD: 8.97 K/UL — SIGNIFICANT CHANGE UP (ref 4.8–10.8)
WBC # FLD AUTO: 8.97 K/UL — SIGNIFICANT CHANGE UP (ref 4.8–10.8)

## 2024-04-01 PROCEDURE — 99233 SBSQ HOSP IP/OBS HIGH 50: CPT

## 2024-04-01 PROCEDURE — 93306 TTE W/DOPPLER COMPLETE: CPT | Mod: 26

## 2024-04-01 RX ADMIN — Medication 25 MILLIGRAM(S): at 05:59

## 2024-04-01 RX ADMIN — Medication 1 TABLET(S): at 12:22

## 2024-04-01 RX ADMIN — CHLORHEXIDINE GLUCONATE 1 APPLICATION(S): 213 SOLUTION TOPICAL at 12:27

## 2024-04-01 RX ADMIN — Medication 1 MILLIGRAM(S): at 12:22

## 2024-04-01 RX ADMIN — Medication 3 MILLIGRAM(S): at 22:51

## 2024-04-01 RX ADMIN — CARVEDILOL PHOSPHATE 12.5 MILLIGRAM(S): 80 CAPSULE, EXTENDED RELEASE ORAL at 06:03

## 2024-04-01 RX ADMIN — Medication 25 MILLIGRAM(S): at 22:51

## 2024-04-01 RX ADMIN — CARVEDILOL PHOSPHATE 12.5 MILLIGRAM(S): 80 CAPSULE, EXTENDED RELEASE ORAL at 17:39

## 2024-04-01 RX ADMIN — ENOXAPARIN SODIUM 40 MILLIGRAM(S): 100 INJECTION SUBCUTANEOUS at 17:39

## 2024-04-01 RX ADMIN — Medication 1 PATCH: at 12:41

## 2024-04-01 RX ADMIN — AMLODIPINE BESYLATE 5 MILLIGRAM(S): 2.5 TABLET ORAL at 05:59

## 2024-04-01 RX ADMIN — Medication 100 MILLIGRAM(S): at 12:22

## 2024-04-01 RX ADMIN — Medication 1 PATCH: at 07:50

## 2024-04-01 RX ADMIN — Medication 2 MILLIGRAM(S): at 19:49

## 2024-04-01 RX ADMIN — Medication 25 MILLIGRAM(S): at 13:25

## 2024-04-01 RX ADMIN — Medication 1 PATCH: at 12:22

## 2024-04-01 NOTE — PROGRESS NOTE ADULT - ASSESSMENT
49-year-old Serbian-speaking male brought in by hospital security and EMS for agitated behavior outside of the hospital.  Patient was aggressive in ED requiring 4-point restraints and chemical sedation.  Patient reportedly stated to EMS that he was drinking a lot of alcohol prior to coming into the ER.  Reportedly 1 pint of alcohol every day and stopped 2 days ago    alcohol withdrawal / alcoholic ketoacidosis - resolved / suicidal ideation / suspected thiamine and folate deficiency / hypokalemia     - complete  benzo detox protocol   - supplement and follow repeat  lytes   - thiamine and folate   - psych  following    - DVT prophylaxis.    50 minutes total spent today on patient care

## 2024-04-01 NOTE — PROGRESS NOTE ADULT - ASSESSMENT
# Alcohol withdrawal   # Alcoholic ketoacidosis - resolved   # Depression with suicidal ideation  - mild confusion but no DT   - s/p IVF for alcoholic Ketosis  - Started on Taper BZD   -         DVT - lovenox sq   GI - pantoprazole   Diet - DASH  Activity - IAT   Code - Full code     #Disposition - IP     # Alcohol withdrawal   # Alcoholic ketoacidosis - resolved   # Depression with suicidal ideation  - mild confusion but no DT   - s/p IVF for alcoholic Ketosis  - CIWA PRN and Librium taper D3/5, currently no symptoms   - Psych on board > suicidal ideations but low suspicion for intent. Safe for DC when stable  - CATCH team on board   - Blood work unremarkable   - 1:1 for agitation if needed       DVT - lovenox sq   GI - pantoprazole   Diet - DASH  Activity - IAT   Code - Full code     #Disposition - IP

## 2024-04-02 LAB
1OH-MIDAZOLAM UR CFM-MCNC: NEGATIVE NG/ML — SIGNIFICANT CHANGE UP
ALBUMIN SERPL ELPH-MCNC: 4.4 G/DL — SIGNIFICANT CHANGE UP (ref 3.5–5.2)
ALP SERPL-CCNC: 75 U/L — SIGNIFICANT CHANGE UP (ref 30–115)
ALT FLD-CCNC: 27 U/L — SIGNIFICANT CHANGE UP (ref 0–41)
ANION GAP SERPL CALC-SCNC: 12 MMOL/L — SIGNIFICANT CHANGE UP (ref 7–14)
AST SERPL-CCNC: 15 U/L — SIGNIFICANT CHANGE UP (ref 0–41)
BENZODIAZ UR QL SCN: POSITIVE
BENZODIAZEPINES IN-HOUSE INTERPRETATION: POSITIVE
BILIRUB DIRECT SERPL-MCNC: <0.2 MG/DL — SIGNIFICANT CHANGE UP (ref 0–0.3)
BILIRUB INDIRECT FLD-MCNC: >0.2 MG/DL — SIGNIFICANT CHANGE UP (ref 0.2–1.2)
BILIRUB SERPL-MCNC: 0.4 MG/DL — SIGNIFICANT CHANGE UP (ref 0.2–1.2)
BUN SERPL-MCNC: 14 MG/DL — SIGNIFICANT CHANGE UP (ref 10–20)
CALCIUM SERPL-MCNC: 9.4 MG/DL — SIGNIFICANT CHANGE UP (ref 8.4–10.5)
CHLORIDE SERPL-SCNC: 102 MMOL/L — SIGNIFICANT CHANGE UP (ref 98–110)
CO2 SERPL-SCNC: 25 MMOL/L — SIGNIFICANT CHANGE UP (ref 17–32)
CREAT SERPL-MCNC: 1 MG/DL — SIGNIFICANT CHANGE UP (ref 0.7–1.5)
EGFR: 92 ML/MIN/1.73M2 — SIGNIFICANT CHANGE UP
GLUCOSE SERPL-MCNC: 153 MG/DL — HIGH (ref 70–99)
MAGNESIUM SERPL-MCNC: 2.2 MG/DL — SIGNIFICANT CHANGE UP (ref 1.8–2.4)
NORDIAZEPAM, UR RESULT: NEGATIVE NG/ML — SIGNIFICANT CHANGE UP
PHOSPHATE SERPL-MCNC: 4.4 MG/DL — SIGNIFICANT CHANGE UP (ref 2.1–4.9)
POTASSIUM SERPL-MCNC: 3.7 MMOL/L — SIGNIFICANT CHANGE UP (ref 3.5–5)
POTASSIUM SERPL-SCNC: 3.7 MMOL/L — SIGNIFICANT CHANGE UP (ref 3.5–5)
PROT SERPL-MCNC: 5.9 G/DL — LOW (ref 6–8)
SODIUM SERPL-SCNC: 139 MMOL/L — SIGNIFICANT CHANGE UP (ref 135–146)
TROPONIN T, HIGH SENSITIVITY RESULT: 11 NG/L — SIGNIFICANT CHANGE UP (ref 6–21)

## 2024-04-02 PROCEDURE — 93010 ELECTROCARDIOGRAM REPORT: CPT

## 2024-04-02 PROCEDURE — 99232 SBSQ HOSP IP/OBS MODERATE 35: CPT

## 2024-04-02 RX ORDER — IBUPROFEN 200 MG
600 TABLET ORAL ONCE
Refills: 0 | Status: COMPLETED | OUTPATIENT
Start: 2024-04-02 | End: 2024-04-02

## 2024-04-02 RX ORDER — SODIUM CHLORIDE 0.65 %
2 AEROSOL, SPRAY (ML) NASAL
Refills: 0 | Status: DISCONTINUED | OUTPATIENT
Start: 2024-04-02 | End: 2024-04-03

## 2024-04-02 RX ADMIN — Medication 650 MILLIGRAM(S): at 12:32

## 2024-04-02 RX ADMIN — Medication 1 PATCH: at 19:36

## 2024-04-02 RX ADMIN — Medication 1 MILLIGRAM(S): at 12:19

## 2024-04-02 RX ADMIN — AMLODIPINE BESYLATE 5 MILLIGRAM(S): 2.5 TABLET ORAL at 06:38

## 2024-04-02 RX ADMIN — Medication 1 TABLET(S): at 12:19

## 2024-04-02 RX ADMIN — CHLORHEXIDINE GLUCONATE 1 APPLICATION(S): 213 SOLUTION TOPICAL at 12:19

## 2024-04-02 RX ADMIN — Medication 3 MILLIGRAM(S): at 21:03

## 2024-04-02 RX ADMIN — Medication 1 PATCH: at 12:19

## 2024-04-02 RX ADMIN — Medication 25 MILLIGRAM(S): at 17:20

## 2024-04-02 RX ADMIN — Medication 600 MILLIGRAM(S): at 21:03

## 2024-04-02 RX ADMIN — Medication 650 MILLIGRAM(S): at 13:15

## 2024-04-02 RX ADMIN — Medication 25 MILLIGRAM(S): at 06:38

## 2024-04-02 RX ADMIN — ENOXAPARIN SODIUM 40 MILLIGRAM(S): 100 INJECTION SUBCUTANEOUS at 15:20

## 2024-04-02 RX ADMIN — Medication 1 PATCH: at 12:34

## 2024-04-02 RX ADMIN — Medication 100 MILLIGRAM(S): at 12:19

## 2024-04-02 RX ADMIN — Medication 600 MILLIGRAM(S): at 22:00

## 2024-04-02 RX ADMIN — CARVEDILOL PHOSPHATE 12.5 MILLIGRAM(S): 80 CAPSULE, EXTENDED RELEASE ORAL at 17:20

## 2024-04-02 RX ADMIN — Medication 1 PATCH: at 07:30

## 2024-04-02 RX ADMIN — Medication 2 SPRAY(S): at 22:12

## 2024-04-02 RX ADMIN — CARVEDILOL PHOSPHATE 12.5 MILLIGRAM(S): 80 CAPSULE, EXTENDED RELEASE ORAL at 06:37

## 2024-04-02 NOTE — PROGRESS NOTE ADULT - ASSESSMENT
# Alcohol withdrawal   # Alcoholic ketoacidosis - resolved   # Depression with suicidal ideation  - mild confusion but no DT   - s/p IVF for alcoholic Ketosis  - CIWA PRN and Librium taper >> taper down to 25mg BID Today D4   - Psych on board > suicidal ideations but low suspicion for intent. Safe for DC when stable  - CATCH team on board   - Blood work unremarkable   - 1:1 for agitation if needed       DVT - lovenox sq   GI - pantoprazole   Diet - DASH  Activity - IAT   Code - Full code     #Disposition - IP

## 2024-04-02 NOTE — PROGRESS NOTE ADULT - ASSESSMENT
49-year-old Bhutanese-speaking male brought in by hospital security and EMS for agitated behavior outside of the hospital.  Patient was aggressive in ED requiring 4-point restraints and chemical sedation.  Patient reportedly stated to EMS that he was drinking a lot of alcohol prior to coming into the ER.  Reportedly 1 pint of alcohol every day and stopped 2 days ago    alcohol withdrawal / alcoholic ketoacidosis - resolved / suicidal ideation / suspected thiamine and folate deficiency      - complete  benzo detox protocol   - lytes improved s/p supplementation   - thiamine and folate   - psych  follow up please   - DVT prophylaxis    may DGTF

## 2024-04-03 LAB
ANION GAP SERPL CALC-SCNC: 9 MMOL/L — SIGNIFICANT CHANGE UP (ref 7–14)
BUN SERPL-MCNC: 12 MG/DL — SIGNIFICANT CHANGE UP (ref 10–20)
CALCIUM SERPL-MCNC: 9.4 MG/DL — SIGNIFICANT CHANGE UP (ref 8.4–10.5)
CHLORIDE SERPL-SCNC: 104 MMOL/L — SIGNIFICANT CHANGE UP (ref 98–110)
CO2 SERPL-SCNC: 26 MMOL/L — SIGNIFICANT CHANGE UP (ref 17–32)
CREAT SERPL-MCNC: 1.3 MG/DL — SIGNIFICANT CHANGE UP (ref 0.7–1.5)
EGFR: 67 ML/MIN/1.73M2 — SIGNIFICANT CHANGE UP
GLUCOSE SERPL-MCNC: 118 MG/DL — HIGH (ref 70–99)
MAGNESIUM SERPL-MCNC: 2.1 MG/DL — SIGNIFICANT CHANGE UP (ref 1.8–2.4)
PHOSPHATE SERPL-MCNC: 5.2 MG/DL — HIGH (ref 2.1–4.9)
POTASSIUM SERPL-MCNC: 3.7 MMOL/L — SIGNIFICANT CHANGE UP (ref 3.5–5)
POTASSIUM SERPL-SCNC: 3.7 MMOL/L — SIGNIFICANT CHANGE UP (ref 3.5–5)
SODIUM SERPL-SCNC: 139 MMOL/L — SIGNIFICANT CHANGE UP (ref 135–146)

## 2024-04-03 PROCEDURE — 99233 SBSQ HOSP IP/OBS HIGH 50: CPT

## 2024-04-03 RX ORDER — SODIUM CHLORIDE 0.65 %
1 AEROSOL, SPRAY (ML) NASAL
Refills: 0 | Status: DISCONTINUED | OUTPATIENT
Start: 2024-04-03 | End: 2024-04-09

## 2024-04-03 RX ADMIN — CHLORHEXIDINE GLUCONATE 1 APPLICATION(S): 213 SOLUTION TOPICAL at 11:41

## 2024-04-03 RX ADMIN — Medication 1 PATCH: at 11:41

## 2024-04-03 RX ADMIN — Medication 2 MILLIGRAM(S): at 01:12

## 2024-04-03 RX ADMIN — Medication 1 PATCH: at 07:33

## 2024-04-03 RX ADMIN — Medication 100 MILLIGRAM(S): at 11:41

## 2024-04-03 RX ADMIN — CARVEDILOL PHOSPHATE 12.5 MILLIGRAM(S): 80 CAPSULE, EXTENDED RELEASE ORAL at 06:48

## 2024-04-03 RX ADMIN — AMLODIPINE BESYLATE 5 MILLIGRAM(S): 2.5 TABLET ORAL at 06:48

## 2024-04-03 RX ADMIN — Medication 1 TABLET(S): at 11:41

## 2024-04-03 RX ADMIN — Medication 1 MILLIGRAM(S): at 11:41

## 2024-04-03 RX ADMIN — Medication 25 MILLIGRAM(S): at 06:48

## 2024-04-03 RX ADMIN — Medication 1 PATCH: at 11:30

## 2024-04-03 NOTE — PROGRESS NOTE ADULT - ASSESSMENT
49-year-old American-speaking male brought in by hospital security and EMS for agitated behavior outside of the hospital.  Patient was aggressive in ED requiring 4-point restraints and chemical sedation.  Patient reportedly stated to EMS that he was drinking a lot of alcohol prior to coming into the ER.  Reportedly 1 pint of alcohol every day and stopped 2 days ago    alcohol withdrawal / alcoholic ketoacidosis - resolved / suicidal ideation / suspected thiamine and folate deficiency      - unclear if pt is at baseline mental status   - still receiving occasional prn benzos   - lytes improved s/p supplementation   - thiamine and folate   - psych  follow up please   - family coming to evaluate pts MS   - DVT prophylaxis    may DGTF  50 minutes total spent today on patient care

## 2024-04-03 NOTE — PROGRESS NOTE ADULT - ASSESSMENT
# Alcohol withdrawal   # Alcoholic ketoacidosis - resolved   # Depression with suicidal ideation  - mild confusion but no DT   - s/p IVF for alcoholic Ketosis  - CIWA PRN and Librium taper >> taper down to 25mg BID Today D5  - Psych on board > suicidal ideations but low suspicion for intent. Safe for DC when stable  - CATCH team on board   - Blood work unremarkable   - 1:1 for agitation if needed   - Patient developed acute Altered mental status overnight and this morning > AOx2. Neurology consult   - Will call Friend who had brought him to the hospital  - c/w thiamine       DVT - lovenox sq   GI - pantoprazole   Diet - DASH  Activity - IAT   Code - Full code     #Disposition - IP. Plan for D/C soon, unsure of dispo at this point

## 2024-04-03 NOTE — CHART NOTE - NSCHARTNOTEFT_GEN_A_CORE
Today attempted to call family of the patient     Phone numbers provided as an emergency contact was Mr Mckee (friend): I called him today twice trying to get a story. HE had mentioned that patient can be confused sometimes but it is unlike him. I expressed concerns about his cognitive status and when asked about his family he says "I live in [place] with my wife in an Hotel, but now sure where or the name". Rafi mentions that he lives in an apartment in [place] with his wife (who recently moved to the ) but she does not have an american number and the only number that she has is a Somali number. He proceed Today attempted to call family of the patient     Phone numbers provided as an emergency contact was Mr Mckee (friend): I called him today twice trying to get a story. HE had mentioned that patient can be confused sometimes but it is unlike him. I expressed concerns about his cognitive status and when asked about his family he says "I live in [place] with my wife in an Hotel, but now sure where or the name". Rafi mentions that he lives in an apartment in [place] with his wife (who recently moved to the ) but she does not have an american number and the only number that she has is a [place] number. He proceed to mention that he was having trouble understanding my english and requested a call with an .     Called  who tried to contact him and phone was disconnected >> Left message for a call back I we are trying to understand baseline mentation  Attempted to call wife's phone listed in care coordinator note - not valid as it is a [place] number Today attempted to call family of the patient     Phone numbers provided as an emergency contact was Mr Mckee (friend): I called him today twice trying to get a story. HE had mentioned that patient can be confused sometimes but it is unlike him. I expressed concerns about his cognitive status and when asked about his family he says "I live in [place] with my wife in an Hotel, but now sure where or the name". Rafi mentions that he lives in an apartment in [place] with his wife (who recently moved to the ) but she does not have an american number and the only number that she has is a Northern Irish number. He proceed to mention that he was having trouble understanding my english and requested a call with an .     Called  who tried to contact him and phone was disconnected >> Left message for a call back I we are trying to understand baseline mentation  Attempted to call wife's phone listed in care coordinator note - not valid as it is a Northern Irish number    Update 4pm:   Called with the  at beside to talk to friend Rafi and Wife on the call. Family will be coming to the hospital between tonight and tomorrow to try and see him. They need to see if his mental status is back to baseline    # [id number] Today attempted to call family of the patient     Phone numbers provided as an emergency contact was Mr Mckee (friend): I called him today twice trying to get a story. HE had mentioned that patient can be confused sometimes but it is unlike him. I expressed concerns about his cognitive status and when asked about his family he says "I live in [place] with my wife in an Hotel, but now sure where or the name". Rafi mentions that he lives in an apartment in [place] with his wife (who recently moved to the ) but she does not have an american number and the only number that she has is a Eritrean number. He proceed to mention that he was having trouble understanding my english and requested a call with an .     Called  who tried to contact him and phone was disconnected >> Left message for a call back I we are trying to understand baseline mentation  Attempted to call wife's phone listed in care coordinator note - not valid as it is a Eritrean number    Update 4pm:   Called with the  at beside to talk to friend Rafi and Wife on the call. Family will be coming to the hospital between tonight and tomorrow to try and see him. They need to see if his mental status is back to baseline    # [id number]    Update 5PM:  Notified by RN, patient transport at bedside ready to transfer patient. Patient became aggressive and angry and refuses to leave the room for transport and wants to go home. BONNY galindo.

## 2024-04-04 DIAGNOSIS — F06.4 ANXIETY DISORDER DUE TO KNOWN PHYSIOLOGICAL CONDITION: ICD-10-CM

## 2024-04-04 DIAGNOSIS — F10.20 ALCOHOL DEPENDENCE, UNCOMPLICATED: ICD-10-CM

## 2024-04-04 PROCEDURE — 99233 SBSQ HOSP IP/OBS HIGH 50: CPT

## 2024-04-04 PROCEDURE — 99233 SBSQ HOSP IP/OBS HIGH 50: CPT | Mod: 95

## 2024-04-04 RX ORDER — QUETIAPINE FUMARATE 200 MG/1
50 TABLET, FILM COATED ORAL AT BEDTIME
Refills: 0 | Status: DISCONTINUED | OUTPATIENT
Start: 2024-04-04 | End: 2024-04-05

## 2024-04-04 RX ORDER — IBUPROFEN 200 MG
400 TABLET ORAL ONCE
Refills: 0 | Status: COMPLETED | OUTPATIENT
Start: 2024-04-04 | End: 2024-04-04

## 2024-04-04 RX ORDER — QUETIAPINE FUMARATE 200 MG/1
25 TABLET, FILM COATED ORAL DAILY
Refills: 0 | Status: DISCONTINUED | OUTPATIENT
Start: 2024-04-04 | End: 2024-04-05

## 2024-04-04 RX ORDER — ONDANSETRON 8 MG/1
4 TABLET, FILM COATED ORAL EVERY 6 HOURS
Refills: 0 | Status: DISCONTINUED | OUTPATIENT
Start: 2024-04-04 | End: 2024-04-09

## 2024-04-04 RX ADMIN — Medication 1 PATCH: at 11:43

## 2024-04-04 RX ADMIN — Medication 2 MILLIGRAM(S): at 22:59

## 2024-04-04 RX ADMIN — ONDANSETRON 4 MILLIGRAM(S): 8 TABLET, FILM COATED ORAL at 20:23

## 2024-04-04 RX ADMIN — CARVEDILOL PHOSPHATE 12.5 MILLIGRAM(S): 80 CAPSULE, EXTENDED RELEASE ORAL at 06:26

## 2024-04-04 RX ADMIN — Medication 2 MILLIGRAM(S): at 21:21

## 2024-04-04 RX ADMIN — Medication 2 MILLIGRAM(S): at 05:30

## 2024-04-04 RX ADMIN — Medication 400 MILLIGRAM(S): at 20:21

## 2024-04-04 RX ADMIN — AMLODIPINE BESYLATE 5 MILLIGRAM(S): 2.5 TABLET ORAL at 06:26

## 2024-04-04 RX ADMIN — Medication 1 PATCH: at 07:51

## 2024-04-04 RX ADMIN — Medication 650 MILLIGRAM(S): at 18:36

## 2024-04-04 RX ADMIN — Medication 1 PATCH: at 11:49

## 2024-04-04 RX ADMIN — CHLORHEXIDINE GLUCONATE 1 APPLICATION(S): 213 SOLUTION TOPICAL at 11:58

## 2024-04-04 RX ADMIN — ENOXAPARIN SODIUM 40 MILLIGRAM(S): 100 INJECTION SUBCUTANEOUS at 16:51

## 2024-04-04 RX ADMIN — Medication 400 MILLIGRAM(S): at 21:03

## 2024-04-04 RX ADMIN — Medication 650 MILLIGRAM(S): at 17:48

## 2024-04-04 RX ADMIN — CARVEDILOL PHOSPHATE 12.5 MILLIGRAM(S): 80 CAPSULE, EXTENDED RELEASE ORAL at 17:49

## 2024-04-04 RX ADMIN — Medication 1 PATCH: at 18:58

## 2024-04-04 RX ADMIN — Medication 25 MILLIGRAM(S): at 06:26

## 2024-04-04 NOTE — BH CONSULTATION LIAISON PROGRESS NOTE - NSBHFUPINTERVALCCFT_PSY_A_CORE
Psychiatry reconsulted by primary team with consult request reason as follows:   48 yo, Gabonese-speaking male, hx of HTN, hx of TBI, [place]/ family, per collateral from a friend, prior to admission patient had stopped sleeping for a week or so, then started drinking very heavily compared to usual (up to 1L of wine daily) then on the way to  his wife from the airport, he became agitated, tremulousness, cursing, aggressive so came to the ED last week, was disoriented on arrival and treated for alcohol withdrawal in the ICU, currently stable from that standpoint, but today he's disoriented, paranoid, currently saying he's on the psychiatric unit "locked up," unclear if there's an acute psychiatric component but neuro is also being consulted.     Patient is seen using Gabones[place]  on [facility] - ID# [id number]    Pt's chief complaint at this time is "everything is great"

## 2024-04-04 NOTE — CHART NOTE - NSCHARTNOTEFT_GEN_A_CORE
Called  for pt out of room, climbing on nursing station, Kuwaiti  present and redirection techniques failed, security called for assistance  pt without IV at current time, ordered for IM Ativan 2mg  also adding 4 point restrain , Pt out of control , threatening staff  Case D/W Dr Hughes

## 2024-04-04 NOTE — CONSULT NOTE ADULT - SUBJECTIVE AND OBJECTIVE BOX
FLETCHER PERRY     49y     Male    MRN-[id number]                                                           CC:Patient is a 49y old  Male who presents with a chief complaint of alcohol withdrawal (03 Apr 2024 21:35)    Neuro: Patient is Samoan speaking. 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 He does not habitually drink alcohol and is known to have a pleasant affect without known anger problems. Patient did not sleep for approximately one week, then drank alcohol for a friend's birthday on 3/23. Since then was drinking a liter of wine daily. On day of admission friend was driving patient to [facility] in [place] to  patient's wife. Patient became aggressive, yelling in the airport, was not redirectable, so patient's friend brought him to [facility] ED. Per friend, patient is not himself. He is paranoid.  Per staff, patient is paranoid, is very selective about the translation services he allows staff to use.    HPI:  Patient is a 49-year-old male Samoan-speaking brought in by hospital security and EMS for agitated behavior outside of the hospital.  Patient was aggressive in ED requiring 4-point restraints chemical sedation.  Patient reportedly stated to EMS that he was drinking a lot of alcohol prior to coming into the ER.  Reportedly 1 pint of alcohol every day never stopped 2 days ago.  Patient currently denies abusing alcohol stating he drinks 1 bottle of beer a month. Patient states last drink was about 1 week ago. Patient denies hx of tremors or seizures. Patient reports visual hallucinations in dreams. Patient reported suicidal ideations in ED but currently denies any. Patient states has a hx of depression and suicidal ideations.  (29 Mar 2024 15:53)      ROS:  unable to obtain    Social History: + recent etoh    FAMILY HISTORY:  No pertinent family history in first degree relatives        HEALTH ISSUES - PROBLEM Dx:  HTN        Vital Signs Last 24 Hrs  T(C): 36.1 (04 Apr 2024 06:19), Max: 36.6 (03 Apr 2024 15:08)  T(F): 97 (04 Apr 2024 06:19), Max: 97.9 (03 Apr 2024 15:08)  HR: 65 (04 Apr 2024 06:19) (65 - 74)  BP: 155/84 (04 Apr 2024 06:19) (145/86 - 155/84)  BP(mean): 113 (03 Apr 2024 14:33) (113 - 113)  RR: 16 (04 Apr 2024 06:19) (16 - 20)  SpO2: 99% (03 Apr 2024 18:38) (99% - 100%)          Neuro Exam:  Orientation: oriented to person, oriented to place and oriented to time.   Attention: normal concentrating ability and visual attention was not decreased.   Language: no difficulty naming common objects, no difficulty repeating a phrase, no difficulty writing a sentence, fluency intact, comprehension intact and reading intact.   Fund of knowledge: displays adequate knowledge of personal past history.   Cranial Nerves: visual acuity intact bilaterally, visual fields full to confrontation, pupils equal round and reactive to light, extraocular motion intact, facial sensation intact symmetrically, face symmetrical, hearing was intact bilaterally, tongue and palate midline, head turning and shoulder shrug symmetric and there was no tongue deviation with protrusion.   Motor: muscle tone was normal in all four extremities, muscle strength was normal in all four extremities and normal bulk in all four extremities.   Sensory exam: light touch was intact.   Coordination:. normal gait. balance was intact. there was no past-pointing. no tremor present.   Deep tendon reflexes:   Biceps right 2+. Biceps left 2+.    Triceps right 2+. Triceps left 2+.    Brachioradialis right 2+. Brachioradialis left 2+.    Patella right 2+. Patella left 2+.    Ankle jerk right 2+. Ankle jerk left 2+.   Plantar responses normal on the right, normal on the left.      NIHSS:     Allergies    Allergy Status Unknown    Intolerances       Home Medications:      MEDICATIONS  (STANDING):  amLODIPine   Tablet 5 milliGRAM(s) Oral daily  carvedilol 12.5 milliGRAM(s) Oral every 12 hours  chlordiazePOXIDE 25 milliGRAM(s) Oral every 12 hours  chlorhexidine 2% Cloths 1 Application(s) Topical daily  enoxaparin Injectable 40 milliGRAM(s) SubCutaneous every 24 hours  folic acid 1 milliGRAM(s) Oral daily  multivitamin 1 Tablet(s) Oral daily  nicotine - 21 mG/24Hr(s) Patch 1 Patch Transdermal daily  thiamine 100 milliGRAM(s) Oral daily    MEDICATIONS  (PRN):  acetaminophen     Tablet .. 650 milliGRAM(s) Oral every 6 hours PRN Temp greater or equal to 38C (100.4F), Mild Pain (1 - 3)  aluminum hydroxide/magnesium hydroxide/simethicone Suspension 30 milliLiter(s) Oral every 4 hours PRN Dyspepsia  chlordiazePOXIDE 25 milliGRAM(s) Oral every 1 hour PRN Symptom-triggered: each CIWA -Ar score 8 or GREATER  melatonin 3 milliGRAM(s) Oral at bedtime PRN Insomnia  ondansetron Injectable 4 milliGRAM(s) IV Push every 8 hours PRN Nausea and/or Vomiting  sodium chloride 0.65% Nasal 1 Spray(s) Both Nostrils every 2 hours PRN Nasal Congestion      LABS:    04-03    139  |  104  |  12  ----------------------------<  118<H>  3.7   |  26  |  1.3    Ca    9.4      03 Apr 2024 06:22  Phos  5.2     04-03  Mg     2.1     04-03    TPro  5.9<L>  /  Alb  4.4  /  TBili  0.4  /  DBili  <0.2  /  AST  15  /  ALT  27  /  AlkPhos  75  04-02      CT Head No Cont (03.29.24 @ 01:52)   IMPRESSION:  No acute intracranial pathology.       FLETCHER PERRY     49y     Male    MRN-[id number]                                                           CC:Patient is a 49y old  Male who presents with a chief complaint of alcohol withdrawal (03 Apr 2024 21:35)    Neuro: Patient is Romanian speaking. 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 He does not habitually drink alcohol and is known to have a pleasant affect without known anger problems. Patient did not sleep for approximately one week, then drank alcohol for a friend's birthday on 3/23. Since then was drinking a liter of wine daily. On day of admission friend was driving patient to [place] to  patient's wife. Patient became aggressive, yelling in the airport, was not redirectable, so patient's friend brought him to [facility] ED. Per friend, patient is not himself. He is paranoid.  Per staff, patient is paranoid, is very selective about the translation services he allows staff to use.  He reported: Patient had EEG and MRI brain done approximately 6 years ago to evaluate for headache. They found he was "prone to seizures" and was placed on medication, he does not recall which. He reports he feels like he is "going crazy". Agreeable to EEG and MRI.    HPI:  Patient is a 49-year-old male Romanian-speaking brought in by hospital security and EMS for agitated behavior outside of the hospital.  Patient was aggressive in ED requiring 4-point restraints chemical sedation.  Patient reportedly stated to EMS that he was drinking a lot of alcohol prior to coming into the ER.  Reportedly 1 pint of alcohol every day never stopped 2 days ago.  Patient currently denies abusing alcohol stating he drinks 1 bottle of beer a month. Patient states last drink was about 1 week ago. Patient denies hx of tremors or seizures. Patient reports visual hallucinations in dreams. Patient reported suicidal ideations in ED but currently denies any. Patient states has a hx of depression and suicidal ideations.  (29 Mar 2024 15:53)      ROS:  unable to obtain    Social History: + recent etoh    FAMILY HISTORY:  No pertinent family history in first degree relatives        HEALTH ISSUES - PROBLEM Dx:  HTN        Vital Signs Last 24 Hrs  T(C): 36.1 (04 Apr 2024 06:19), Max: 36.6 (03 Apr 2024 15:08)  T(F): 97 (04 Apr 2024 06:19), Max: 97.9 (03 Apr 2024 15:08)  HR: 65 (04 Apr 2024 06:19) (65 - 74)  BP: 155/84 (04 Apr 2024 06:19) (145/86 - 155/84)  BP(mean): 113 (03 Apr 2024 14:33) (113 - 113)  RR: 16 (04 Apr 2024 06:19) (16 - 20)  SpO2: 99% (03 Apr 2024 18:38) (99% - 100%)          Neuro Exam:  Orientation: oriented to person, oriented to place and oriented to time.   Attention: normal concentrating ability and visual attention was not decreased.   Language: no difficulty naming common objects, no difficulty repeating a phrase, no difficulty writing a sentence, fluency intact, comprehension intact and reading intact.   Fund of knowledge: displays adequate knowledge of personal past history.   Cranial Nerves: visual acuity intact bilaterally, visual fields full to confrontation, pupils equal round and reactive to light, extraocular motion intact, facial sensation intact symmetrically, face symmetrical, hearing was intact bilaterally, tongue and palate midline, head turning and shoulder shrug symmetric and there was no tongue deviation with protrusion.   Motor: muscle tone was normal in all four extremities, muscle strength was normal in all four extremities and normal bulk in all four extremities.   Sensory exam: light touch was intact.   Coordination:. normal gait. balance was intact. there was no past-pointing. no tremor present.   Deep tendon reflexes:   Biceps right 2+. Biceps left 2+.    Triceps right 2+. Triceps left 2+.    Brachioradialis right 2+. Brachioradialis left 2+.    Patella right 2+. Patella left 2+.    Ankle jerk right 2+. Ankle jerk left 2+.   Plantar responses normal on the right, normal on the left.      NIHSS:     Allergies    Allergy Status Unknown    Intolerances       Home Medications:      MEDICATIONS  (STANDING):  amLODIPine   Tablet 5 milliGRAM(s) Oral daily  carvedilol 12.5 milliGRAM(s) Oral every 12 hours  chlordiazePOXIDE 25 milliGRAM(s) Oral every 12 hours  chlorhexidine 2% Cloths 1 Application(s) Topical daily  enoxaparin Injectable 40 milliGRAM(s) SubCutaneous every 24 hours  folic acid 1 milliGRAM(s) Oral daily  multivitamin 1 Tablet(s) Oral daily  nicotine - 21 mG/24Hr(s) Patch 1 Patch Transdermal daily  thiamine 100 milliGRAM(s) Oral daily    MEDICATIONS  (PRN):  acetaminophen     Tablet .. 650 milliGRAM(s) Oral every 6 hours PRN Temp greater or equal to 38C (100.4F), Mild Pain (1 - 3)  aluminum hydroxide/magnesium hydroxide/simethicone Suspension 30 milliLiter(s) Oral every 4 hours PRN Dyspepsia  chlordiazePOXIDE 25 milliGRAM(s) Oral every 1 hour PRN Symptom-triggered: each CIWA -Ar score 8 or GREATER  melatonin 3 milliGRAM(s) Oral at bedtime PRN Insomnia  ondansetron Injectable 4 milliGRAM(s) IV Push every 8 hours PRN Nausea and/or Vomiting  sodium chloride 0.65% Nasal 1 Spray(s) Both Nostrils every 2 hours PRN Nasal Congestion      LABS:    04-03    139  |  104  |  12  ----------------------------<  118<H>  3.7   |  26  |  1.3    Ca    9.4      03 Apr 2024 06:22  Phos  5.2     04-03  Mg     2.1     04-03    TPro  5.9<L>  /  Alb  4.4  /  TBili  0.4  /  DBili  <0.2  /  AST  15  /  ALT  27  /  AlkPhos  75  04-02      CT Head No Cont (03.29.24 @ 01:52)   IMPRESSION:  No acute intracranial pathology.

## 2024-04-04 NOTE — CONSULT NOTE ADULT - NS ATTEND AMEND GEN_ALL_CORE FT
Patient was seen at bedside.   Patient mentions he knows he is not feeling well and his mentation as changed.   Patient mentions he is at risk for seizures when he was examined 6-7 years ago. He had a EEG and MRI completed at that time but does not know what was the results.   Plan:   will need to MRI brain w/wo   vEEG   psych consult

## 2024-04-04 NOTE — CONSULT NOTE ADULT - ASSESSMENT
Patient is a 49 year old Serbian speaking male with hx of HTN. 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 He does not habitually drink alcohol and is known to have a pleasant affect without known anger problems. Patient did not sleep for approximately one week, then drank alcohol for a friend's birthday on 3/23. Since then was drinking a liter of wine daily. On day of admission 3/29 friend was driving patient to [facility] to  patient's wife. Patient became aggressive, yelling in the airport, was not redirectable, so patient's friend brought him to [facility] ED. Per friend, patient is currently not himself. He is paranoid.  Per staff, patient is paranoid, is very selective about the translation services he allows staff to use and selective about who he speaks with.  He reported to staff: He had EEG and MRI brain done approximately 6 years ago to evaluate for headache. They found he was "prone to seizures" and was placed on medication, he does not recall which. He reports he feels like he is "going crazy". Agreeable to EEG and MRI.        # confusion  # alcohol withdrawal  Patient reports prior EEG suspicious for seizure potential. It is possible that patient has a psychiatric illness presenting as adelaida causing prolonged sleep deprivation provoking a seizure. 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 UDS positive for valium which patient received in ED upon arrival. CTH neg.    -Recommend psychiatric eval. In the meantime, obtain MRI brain with and without contrast, then begin video EEG monitoring. Continue seizure precautions. Will follow.

## 2024-04-04 NOTE — CHART NOTE - NSCHARTNOTEFT_GEN_A_CORE
Pt seen and examined bedside.     Pt in 4 point restraint, pt is alert awake not in DTs    Rec:   - will monitor on medical floor  - c/w Ativan q4  - start librium   - replete electrolytes  - no precedex needed at this time   - c/w thiamine / folate

## 2024-04-04 NOTE — CHART NOTE - NSCHARTNOTEFT_GEN_A_CORE
code gray called  pt out of room, climbing on nursing station, Latvian  present and redirection techniques failed, security called for assistance  pt without IV at current time, ordered for IM Ativan 2mg

## 2024-04-04 NOTE — BH CONSULTATION LIAISON PROGRESS NOTE - OTHER
limited by altered mental state "great" mostly notably for tangential ruminative content; at times unrelated to the question asked; vague mention of paranoia and suicidality but unable to confirm if actual suicidality or clarify details, severity or timeline.

## 2024-04-04 NOTE — BH CONSULTATION LIAISON PROGRESS NOTE - NSBHCONSULTRECOMMENDOTHER_PSY_A_CORE FT
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  - Recommend Seroquel 50 mg HS standing for sleep and psychosis  - Recommend Seroquel 25 mg daily PRN severe anxiety, restlessness or psychosis    Delirium Provisions:   - Assess and optimize pain control  - Avoid use of anticholinergic, benzodiazepine and opioid medications  	-when opioids are absolutely needed, preference for low dose hydromorphone or oxycodone   		(less deliriogenic than morphine or fentanyl)  - Continue with verbal redirection/reorientation, Utilize family assistance virtually or in person for  redirection and reorientation    - Early mobilization with efforts to move out of bed to chair, PT and ambulation as appropriate    - Consolidate sleep at night, - Maintain shades open during day, -Minimize night time awakenings, noise and unnecessary nursing interventions at night  - Assess and promote normal and frequent healthy bowel movement    - Monitor for urinary retention   - Continue to promote oral hydration and nutritional intake.  - Minimize the use of physical restraints as possible   - Address known sensory deficits with the use of necessary aids such as dentures, glasses, hearing aids, amplifiers etc.

## 2024-04-04 NOTE — BH CONSULTATION LIAISON PROGRESS NOTE - NSBHCONSULTFOLLOWAFTERCARE_PSY_A_CORE FT
Patient may benefit from outpatient psychiatric follow up for management of anxiety and psychosis symptoms comorbid with and likely secondary to adrenal pathology.

## 2024-04-04 NOTE — CHART NOTE - NSCHARTNOTEFT_GEN_A_CORE
Code gray called for pt agitated standing out in front of nurse station .  pt was refusing to go back to his room.  Plan ativan 2 mg po x 1 dose (pt asking to PO instead of IM)

## 2024-04-04 NOTE — BH CONSULTATION LIAISON PROGRESS NOTE - NSBHFUPINTERVALHXFT_PSY_A_CORE
Patient is seen using [facility] of [place]  on [facility] - ID# [id number].    He reports that "everything is great" and explains he ended up here because "I was in [place] and then I came to [place] and I started feeling unwell there." "I started feeling like a fool" and "in a hostel." "I couldn't think well, I felt very confused and I lost my memory." He conveys feeling better now and denies any lingering confusion. He affirms feeling off of his baseline for "maybe 2 or 3 weeks" before his hospital arrival "I just wanted to run away somewhere and I was feeling very scared." "I was scared of death" affirming paranoia of something or someone trying to kill him. He affirms he felt this way out of the blue without a clear trigger. He affirms "I slept barely at all," felt more depressed, irritable, "very excited" during that period. He also reports he was having pain in his neck and back "as if I slept on a harsh surface" also conveying other pains in his joints, head and chest intermittently. He denies anxiety,   He affirms instances of death wishes during his period of poor sleep and heightened energy in addition to SI - of course but I don't feel comfortable talking about it   I have that now too because my family should've come but they didn't. W hen prompted as to whether he would ever try to end his life, he states "well I already had those moments" "well I didn't do it to that extent but there were some specific moments where I felt like I was in danger." "in those extreme moments where you start feeling in danger." He requests from the tele-presenter "can I talk to the doctor personally, I'm a little shy." He then calls in another staff member and requests for a private area for the interview. 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 2 years later, was driving again, felt bad again, started having heart problems and passed out, went to the doctor again who said he had blood pressure issues, prescribed a medication Norvasc but blood pressure was still high. started feeling unwell again, saw the doctor, said he had hypertensive crisis and wanted him to have an experimental pill which he took for a while but it didn't help at all, resumed Norvasc from 5mg to 10mg started made progress.     at first thought it was because of the kidney, because I had the tumor there, then was told it was because of the alcohol. kidney, throws out cortisol and increases the blood pressure. was told to check in every 2 years "but I didn't want to." was having head pain so got check and "I was told that I had a mini stroke" "maybe 2 years" ago. He tells me how many years ago he also had heart problems, chest pains, saw a doctor and said he was fine, but prescribed a long list of medications.  denies psych hx or psych medications, jokingly stating "only alcohol and cigarettes." He initially reports having an evening 1 glass of alcohol but admits to instances of excessive daily drinking, might not drink for a month, then friends will encourage him to resume, maximum times I don't drink is 3 weeks  can have "6 little bottles of beer" or small glass of whiskey or tequila. when he came to the [place], didn't drink for 3 months, then started drinking beer everyday, champagne, "so before coming here I've drinking for a month." I think when the world is getting better I need to talk to a psychologist. when I talk about it I understand" that he thought he "was crazy." He tells me how he works out in the morning for an hour, feels well, smokes a cigarettes, by evening starts feeling bad again, goes to work, returns, feels even worse, starts drinking then feels better elaborating that whenever he tries to quit alcohol "I just start feeling horrible." He reports leg pains in his right foot, ad severe pain whenever I eat something fat."  MSE - often unable to attend to questions directly     On assessment, patient is initially seen at bedside, reports that "everything is great" and explains he ended up here because "I was in [place] and then I came to [place] and I started feeling unwell there." He describes he "started feeling like a fool," like he was "in a hostel," "I couldn't think well, I felt very confused and I lost my memory." He conveys feeling better now and denies any lingering confusion. He affirms feeling off of his baseline for "maybe 2 or 3 weeks" before his hospital arrival stating "I just wanted to run away somewhere and I was feeling very scared." He states "I was scared of death" affirming paranoia of something or someone trying to kill him. He affirms he felt this way out of the blue without a clear trigger. He affirms "I slept barely at all," felt more depressed, irritable, "very excited" during that period. He also reports he was having pain in his neck and back "as if I slept on a harsh surface" also conveying other pains in his joints, head and chest intermittently. He denies anxiety or worries during that time. He affirms instances of death wishes during his period of poor sleep and heightened energy in addition to vague SI  stating "of course but I don't feel comfortable talking about it." He even relays "I have that now too because my family should've come but they didn't." When prompted as to whether he would ever try to end his life, he states "well I already had those moments" "well I didn't do it to that extent but there were some specific moments where I felt like I was in danger" conveying something to the effect that those thoughts occur "in those extreme moments where you start feeling in danger." He subsequently requests from the tele-presenter "can I talk to the doctor personally, I'm a little shy." He then calls in another staff member and requests for a private area for the interview.     When interviewed in a private room, patient conveys "it often happens to me whenever I'm stuck in traffic, I often want to get out of the car and run away, I'm also afraid of flying and of planes, and whenever I get scared I want to go to the bathroom." He then tells me how "5 or 10 years ago, once I was stuck in traffic and I started feeling so bad, I even passed out, they took me to the hospital, brought me into consciousness," elaborating that he stayed there for 2 weeks, had all sort of tests done and "they told me that I have an adenoma on my adrenal glands and they also said that I had a tumor that I need to have a surgery but I didn't want to do that." He explains how he went to another doctor that said he didn't have to do the surgery, did all the labs, "CT with iodine, checked my kidneys," and the doctor said he could do check ups and didn't need the surgery at that time. 2 years later, he was driving again, felt badly again, started having heart pains and passed out, went to the doctor again who said he had blood pressure issues and prescribed a medication Norvasc but blood pressure was still high. He started feeling unwell again, saw the doctor who said he had hypertensive crisis and wanted him to have an experimental pill which he took for a while but it didn't help at all, resumed Norvasc from 5mg to 10mg started making progress.     at first thought it was because of the kidney, because I had the tumor there, then was told it was because of the alcohol. kidney, throws out cortisol and increases the blood pressure. was told to check in every 2 years "but I didn't want to." was having head pain so got check and "I was told that I had a mini stroke" "maybe 2 years" ago. He tells me how many years ago he also had heart problems, chest pains, saw a doctor and said he was fine, but prescribed a long list of medications.  denies psych hx or psych medications, jokingly stating "only alcohol and cigarettes." He initially reports having an evening 1 glass of alcohol but admits to instances of excessive daily drinking, might not drink for a month, then friends will encourage him to resume, maximum times I don't drink is 3 weeks  can have "6 little bottles of beer" or small glass of whiskey or tequila. when he came to the [place], didn't drink for 3 months, then started drinking beer everyday, champagne, "so before coming here I've drinking for a month." I think when the world is getting better I need to talk to a psychologist. when I talk about it I understand" that he thought he "was crazy." He tells me how he works out in the morning for an hour, feels well, smokes a cigarettes, by evening starts feeling bad again, goes to work, returns, feels even worse, starts drinking then feels better elaborating that whenever he tries to quit alcohol "I just start feeling horrible." He reports leg pains in his right foot, ad severe pain whenever I eat something fat."  MSE - often unable to attend to questions directly     On assessment, patient is initially seen at bedside, reports that "everything is great" and explains he ended up here because "I was in [place] and then I came to [place] and I started feeling unwell there." He describes he "started feeling like a fool," like he was "in a hostel," "I couldn't think well, I felt very confused and I lost my memory." He conveys feeling better now and denies any lingering confusion. He affirms feeling off of his baseline for "maybe 2 or 3 weeks" before his hospital arrival stating "I just wanted to run away somewhere and I was feeling very scared." He states "I was scared of death" affirming paranoia of something or someone trying to kill him. He affirms he felt this way out of the blue without a clear trigger. He affirms "I slept barely at all," felt more depressed, irritable, "very excited" during that period. He also reports he was having pain in his neck and back "as if I slept on a harsh surface" also conveying other pains in his joints, head and chest intermittently. He denies anxiety or worries during that time. He affirms instances of death wishes during his period of poor sleep and heightened energy in addition to vague SI  stating "of course but I don't feel comfortable talking about it." He even relays "I have that now too because my family should've come but they didn't." When prompted as to whether he would ever try to end his life, he states "well I already had those moments" "well I didn't do it to that extent but there were some specific moments where I felt like I was in danger" conveying something to the effect that those thoughts occur "in those extreme moments where you start feeling in danger." He subsequently requests from the tele-presenter "can I talk to the doctor personally, I'm a little shy." He then calls in another staff member and requests for a private area for the interview.     When interviewed in a private room, patient conveys "it often happens to me whenever I'm stuck in traffic, I often want to get out of the car and run away, I'm also afraid of flying and of planes, and whenever I get scared I want to go to the bathroom." He then tells me how "5 or 10 years ago, once I was stuck in traffic and I started feeling so bad, I even passed out, they took me to the hospital, brought me into consciousness," elaborating that he stayed there for 2 weeks, had all sort of tests done and "they told me that I have an adenoma on my adrenal glands and they also said that I had a tumor that I need to have a surgery but I didn't want to do that." He explains how he went to another doctor that said he didn't have to do the surgery, did all the labs, "CT with iodine, checked my kidneys," and the doctor said he could do check ups and didn't need the surgery at that time. 2 years later, he was driving again, felt badly again, started having heart pains and passed out, went to the doctor again who said he had blood pressure issues and prescribed a medication Norvasc but blood pressure was still high. He started feeling unwell again, saw the doctor who said he had hypertensive crisis and wanted him to have an experimental pill which he took for a while but it didn't help at all, resumed Norvasc from 5mg to 10mg started making progress.     Patient goes on to say that when he came to the hospital, at first he thought it was because of the kidney, because I had the tumor there, then he was told it was because of the alcohol. He explains that he has been told the kidney issue "throws out cortisol and increases the blood pressure," that he also was told to check in every 2 years "but I didn't want to." He tells me of another time he was having head pain so got checked out and "I was told that I had a mini stroke" occuring "maybe 2 years" ago. He also tells me how many years ago he had heart problems, chest pains, saw a doctor and said he was fine, but prescribed a long list of medications. He denies psych hx or psych medications, jokingly stating "only alcohol and cigarettes." He initially reports having 1 glass of alcohol per evening but reports instances of excessive daily drinking and that the "maximum times I don't drink is 3 weeks." He says he can have "6 little bottles of beer" or small glasses of whiskey or tequila, that when he came to the [place] he didn't drink for 3 months, then started drinking beer everyday, champagne, "so before coming here I've been drinking for a month." He then relays "I think when the world is getting better I need to talk to a psychologist; when I talk about it I understand," that he thought he "was crazy." He tells me how he works out in the morning for an hour, feels well, smokes a cigarettes, by evening starts feeling badly again, goes to work, returns, feels even worse, starts drinking then feels better elaborating that whenever he tries to quit alcohol "I just start feeling horrible." He reports leg pains in his right foot, ad severe pain whenever I eat something fat." Assessment is otherwise limited by patient's tangentiality.

## 2024-04-05 PROCEDURE — 99233 SBSQ HOSP IP/OBS HIGH 50: CPT

## 2024-04-05 PROCEDURE — 74177 CT ABD & PELVIS W/CONTRAST: CPT | Mod: 26

## 2024-04-05 PROCEDURE — 99233 SBSQ HOSP IP/OBS HIGH 50: CPT | Mod: 95

## 2024-04-05 RX ORDER — HALOPERIDOL DECANOATE 100 MG/ML
2.5 INJECTION INTRAMUSCULAR EVERY 6 HOURS
Refills: 0 | Status: DISCONTINUED | OUTPATIENT
Start: 2024-04-05 | End: 2024-04-09

## 2024-04-05 RX ORDER — NICOTINE POLACRILEX 2 MG
2 GUM BUCCAL
Refills: 0 | Status: DISCONTINUED | OUTPATIENT
Start: 2024-04-05 | End: 2024-04-09

## 2024-04-05 RX ORDER — HALOPERIDOL DECANOATE 100 MG/ML
5 INJECTION INTRAMUSCULAR
Refills: 0 | Status: DISCONTINUED | OUTPATIENT
Start: 2024-04-05 | End: 2024-04-09

## 2024-04-05 RX ORDER — QUETIAPINE FUMARATE 200 MG/1
50 TABLET, FILM COATED ORAL AT BEDTIME
Refills: 0 | Status: DISCONTINUED | OUTPATIENT
Start: 2024-04-05 | End: 2024-04-09

## 2024-04-05 RX ORDER — HALOPERIDOL DECANOATE 100 MG/ML
5 INJECTION INTRAMUSCULAR ONCE
Refills: 0 | Status: COMPLETED | OUTPATIENT
Start: 2024-04-05 | End: 2024-04-05

## 2024-04-05 RX ADMIN — Medication 2 MILLIGRAM(S): at 13:29

## 2024-04-05 RX ADMIN — Medication 2 MILLIGRAM(S): at 07:08

## 2024-04-05 RX ADMIN — CARVEDILOL PHOSPHATE 12.5 MILLIGRAM(S): 80 CAPSULE, EXTENDED RELEASE ORAL at 17:51

## 2024-04-05 RX ADMIN — HALOPERIDOL DECANOATE 5 MILLIGRAM(S): 100 INJECTION INTRAMUSCULAR at 13:30

## 2024-04-05 RX ADMIN — Medication 2 MILLIGRAM(S): at 17:50

## 2024-04-05 RX ADMIN — HALOPERIDOL DECANOATE 5 MILLIGRAM(S): 100 INJECTION INTRAMUSCULAR at 17:50

## 2024-04-05 RX ADMIN — Medication 2 MILLIGRAM(S): at 15:30

## 2024-04-05 NOTE — BH CONSULTATION LIAISON PROGRESS NOTE - NSBHCONSULTRECOMMENDOTHER_PSY_A_CORE FT
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 & if other endocrine workup should be done  - Agree with neuro workup surrounding possible seizure hx  - Follow up on pending cortisol, MRI and EEG  - Discontinue current Seroquel orders  - Start Haldol 5 mg PO BID standing for agitation and psychosis in delirium  - Start Haldol 2.5 mg PO q6 PRN severe anxiety, restlessness or psychosis where patient is accepting of PO medication  - For severe agitation with safety threats, would recommend Haldol 5 mg + Ativan 2 mg IM PRN  	-dose can be repeated after 15 minutes if ongoing agitation (max TDD of Haldol should be 30 mg from PRN and standing orders combined)  	-obtain ECG on subsequent days after parenteral Haldol administration to ensure QTc <500 ms  	-maintain K+>=4.0 & Mg>=2.0 to minimize risk of torsades  - Recommend Seroquel 50 mg HS PRN insomnia   - Maintain 1:1 observation in light of agitation episodes and paranoia  - Patient does not have capacity to leave AMA in his current mental state    Delirium Provisions:   - Assess and optimize pain control  - Avoid use of anticholinergic, benzodiazepine (unless indicated clinically; i.e seizure or withdrawal sx) and opioid medications  	-when opioids are absolutely needed, preference for low dose hydromorphone or oxycodone   		(less deliriogenic than morphine or fentanyl)  - Continue with verbal redirection/reorientation, Utilize family assistance virtually or in person for  redirection and reorientation    - Early mobilization with efforts to move out of bed to chair, PT and ambulation as appropriate    - Consolidate sleep at night, - Maintain shades open during day, -Minimize night time awakenings, noise and unnecessary nursing interventions at night  - Assess and promote normal and frequent healthy bowel movement    - Monitor for urinary retention   - Continue to promote oral hydration and nutritional intake.  - Minimize the use of physical restraints as possible   - Address known sensory deficits with the use of necessary aids such as dentures, glasses, hearing aids, amplifiers etc.  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 & if other endocrine workup should be done  - Agree with neuro workup surrounding possible seizure hx  - Follow up on pending cortisol, MRI and EEG  - Discontinue current Seroquel orders  - Start Haldol 5 mg PO BID standing for agitation and psychosis in delirium  - Start Haldol 2.5 mg PO q6 PRN severe anxiety, restlessness or psychosis where patient is accepting of PO medication  - For severe agitation with safety threats, would recommend Haldol 5 mg + Ativan 2 mg IM PRN  	-dose can be repeated after 15 minutes if ongoing agitation (max TDD of Haldol should be 30 mg from PRN and standing orders combined)  	-obtain ECG on subsequent days after parenteral Haldol administration to ensure QTc <500 ms  	-maintain K+>=4.0 & Mg>=2.0 to minimize risk of torsades  - Recommend Seroquel 50 mg HS PRN insomnia   - Maintain 1:1 observation in light of agitation episodes and paranoia  - Patient does not have capacity to leave AMA in his current mental state and may ultimately require inpatient psychiatric admission (high likelihood with his current affective psychotic mental state if medical workup is unremarkable for an acute, actionable organic etiology)    Delirium Provisions:   - Assess and optimize pain control  - Avoid use of anticholinergic, benzodiazepine (unless indicated clinically; i.e seizure or withdrawal sx) and opioid medications  	-when opioids are absolutely needed, preference for low dose hydromorphone or oxycodone   		(less deliriogenic than morphine or fentanyl)  - Continue with verbal redirection/reorientation, Utilize family assistance virtually or in person for  redirection and reorientation    - Early mobilization with efforts to move out of bed to chair, PT and ambulation as appropriate    - Consolidate sleep at night, - Maintain shades open during day, -Minimize night time awakenings, noise and unnecessary nursing interventions at night  - Assess and promote normal and frequent healthy bowel movement    - Monitor for urinary retention   - Continue to promote oral hydration and nutritional intake.  - Minimize the use of physical restraints as possible   - Address known sensory deficits with the use of necessary aids such as dentures, glasses, hearing aids, amplifiers etc.

## 2024-04-05 NOTE — BH CONSULTATION LIAISON PROGRESS NOTE - NSBHFUPINTERVALHXFT_PSY_A_CORE
I would like to do zoom  I want to speak on zoom with my daughter, my wife and my son    you're lying I know you're not able to see me    Javier - I have already provided that number  you can contact Rafi    I feel myself fine today, I was tense yesterday, however I'm hoping I'm not going to get as tense as I was yesterday  cursing, I don't how I'm going to react    denies sleeping last night  affirms feeling "Like I'm in danger"   that's why I would like to see my parents and my daughter    becomes paranoid asking why the  is spekaing to this writer in English, that he is supposed to be speaking to him in [place].  On assessment today, patient is visibly irritable and restless, often speaking over  limiting assessment. He conveys "I would like to do zoom," "I want to speak on zoom with my daughter, my wife and my son." He also asks whether this writer can see him then becomes accusatory stating "you're lying, I know you're not able to see me." He consents to speaking with his family, reports his son's name is also Javier but does not convey a number stating "I have already provided that number." He is informed of only one emergency contact, Rafi, listed in his chart to which he replies stating "you can contact Rafi." On attempted ROS, he conveys his mood as "I feel myself fine today, I was tense yesterday, however I'm hoping I'm not going to get as tense as I was yesterday" elaborating incomprehensibly with profane language about events from yesterday and stating if it were to happen again, "I don't how I'm going to react." He denies sleeping last night, affirms feeling "like I'm in danger," adding "that's why I would like to see my parents and my daughter." He then becomes paranoid asking why the  is speaking to this writer in English, that he is supposed to be speaking to him in [place] (apparently disoriented to the interpretation process). He does not allow for explanation before he gets up, walking away and making a phone call. Unable to obtain additional history from patient at this time.     Collateral attempted from patient's emergency contact (Rafigo Danielle - [phone]), no answer, message left and no answer on 2nd attempt.      Collateral history is reviewed as per Neurology documentation yesterday as follows:   Neuro: Patient is [place] speaking. 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 He does not habitually drink alcohol and is known to have a pleasant affect without known anger problems. Patient did not sleep for approximately one week, then drank alcohol for a friend's birthday on 3[birth year]. Since then was drinking a liter of wine daily. On day of admission friend [place] [facility] [place] [facility] [place]  patient's wife. Patient became aggressive, yelling in the airport, was not redirectable, so patient's friend brought him to [facility] ED. Per friend, patient is not himself. He is paranoid. Per staff, patient is paranoid, is very selective about the translation services he allows staff to use. He reported: Patient had EEG and MRI brain done approximately 6 years ago to evaluate for headache. They found he was "prone to seizures" and was placed on medication, he does not recall which. He reports he feels like he is "going crazy". Agreeable to EEG and MRI.

## 2024-04-06 PROCEDURE — 99232 SBSQ HOSP IP/OBS MODERATE 35: CPT

## 2024-04-06 PROCEDURE — 70551 MRI BRAIN STEM W/O DYE: CPT | Mod: 26

## 2024-04-06 RX ADMIN — Medication 2 MILLIGRAM(S): at 13:52

## 2024-04-06 RX ADMIN — Medication 2 MILLIGRAM(S): at 18:52

## 2024-04-06 RX ADMIN — Medication 1 MILLIGRAM(S): at 13:49

## 2024-04-06 RX ADMIN — Medication 100 MILLIGRAM(S): at 13:49

## 2024-04-06 RX ADMIN — CARVEDILOL PHOSPHATE 12.5 MILLIGRAM(S): 80 CAPSULE, EXTENDED RELEASE ORAL at 17:30

## 2024-04-06 RX ADMIN — HALOPERIDOL DECANOATE 2.5 MILLIGRAM(S): 100 INJECTION INTRAMUSCULAR at 08:55

## 2024-04-06 RX ADMIN — HALOPERIDOL DECANOATE 5 MILLIGRAM(S): 100 INJECTION INTRAMUSCULAR at 14:18

## 2024-04-06 RX ADMIN — Medication 2 MILLIGRAM(S): at 09:00

## 2024-04-06 RX ADMIN — Medication 1 TABLET(S): at 13:48

## 2024-04-06 RX ADMIN — HALOPERIDOL DECANOATE 2.5 MILLIGRAM(S): 100 INJECTION INTRAMUSCULAR at 18:16

## 2024-04-07 LAB — CORTIS AM PEAK SERPL-MCNC: 15.6 UG/DL — SIGNIFICANT CHANGE UP (ref 6–18.4)

## 2024-04-07 PROCEDURE — 93010 ELECTROCARDIOGRAM REPORT: CPT

## 2024-04-07 PROCEDURE — 99232 SBSQ HOSP IP/OBS MODERATE 35: CPT

## 2024-04-07 RX ADMIN — CARVEDILOL PHOSPHATE 12.5 MILLIGRAM(S): 80 CAPSULE, EXTENDED RELEASE ORAL at 17:03

## 2024-04-07 RX ADMIN — Medication 1 PATCH: at 11:47

## 2024-04-07 RX ADMIN — Medication 650 MILLIGRAM(S): at 16:04

## 2024-04-07 RX ADMIN — AMLODIPINE BESYLATE 5 MILLIGRAM(S): 2.5 TABLET ORAL at 06:04

## 2024-04-07 RX ADMIN — Medication 2 MILLIGRAM(S): at 11:47

## 2024-04-07 RX ADMIN — Medication 1 MILLIGRAM(S): at 11:47

## 2024-04-07 RX ADMIN — Medication 1 TABLET(S): at 11:47

## 2024-04-07 RX ADMIN — Medication 30 MILLILITER(S): at 11:51

## 2024-04-07 RX ADMIN — HALOPERIDOL DECANOATE 5 MILLIGRAM(S): 100 INJECTION INTRAMUSCULAR at 17:03

## 2024-04-07 RX ADMIN — Medication 100 MILLIGRAM(S): at 11:47

## 2024-04-07 RX ADMIN — CARVEDILOL PHOSPHATE 12.5 MILLIGRAM(S): 80 CAPSULE, EXTENDED RELEASE ORAL at 06:03

## 2024-04-07 RX ADMIN — Medication 1 PATCH: at 19:14

## 2024-04-07 RX ADMIN — HALOPERIDOL DECANOATE 5 MILLIGRAM(S): 100 INJECTION INTRAMUSCULAR at 06:07

## 2024-04-07 RX ADMIN — ONDANSETRON 4 MILLIGRAM(S): 8 TABLET, FILM COATED ORAL at 11:54

## 2024-04-07 NOTE — CONSULT NOTE ADULT - CONSULT REASON
EtOH withdrawal
alcohol abuse
Adrenal nodule
Consfusion? PAtient admitted for alcohol withdrawal, has been on librium taper and no longer withdrawin. Developing altered mentation which is intermittent. currently on thiamine, assessment for wernicke related encephalopathy

## 2024-04-07 NOTE — PROGRESS NOTE ADULT - REASON FOR ADMISSION
alcohol withdrawal

## 2024-04-07 NOTE — PROGRESS NOTE ADULT - PROVIDER SPECIALTY LIST ADULT
Critical Care
Critical Care
Hospitalist
Critical Care
Hospitalist
Critical Care
Hospitalist

## 2024-04-07 NOTE — PROGRESS NOTE ADULT - SUBJECTIVE AND OBJECTIVE BOX
Patient seen and evaluated this am and again later in the afternoon with Danish speaking RN. Pt at times seems calm and oriented, but at times pt becomes confused and agitated       T(F): 97.5 (04-03-24 @ 18:38), Max: 97.9 (04-03-24 @ 15:08)  HR: 74 (04-03-24 @ 18:38)  BP: 145/86 (04-03-24 @ 18:38)  RR: 16 (04-03-24 @ 18:38)  SpO2: 99% (04-03-24 @ 18:38) (99% - 100%)    PHYSICAL EXAM:  GENERAL: NAD  HEAD:  Atraumatic, Normocephalic  EYES: EOMI, PERRLA, conjunctiva and sclera clear  NERVOUS SYSTEM:  no focal deficits   CHEST/LUNG: Clear to percussion bilaterally; No rales, rhonchi, wheezing, or rubs  HEART: Regular rate and rhythm; No murmurs, rubs, or gallops  ABDOMEN: Soft, Nontender, Nondistended; Bowel sounds present  EXTREMITIES:  2+ Peripheral Pulses, No clubbing, cyanosis, or edema    LABS  04-03    139  |  104  |  12  ----------------------------<  118<H>  3.7   |  26  |  1.3    Ca    9.4      03 Apr 2024 06:22  Phos  5.2     04-03  Mg     2.1     04-03    TPro  5.9<L>  /  Alb  4.4  /  TBili  0.4  /  DBili  <0.2  /  AST  15  /  ALT  27  /  AlkPhos  75  04-02      Culture Results:   No growth (03-29-24)    RADIOLOGY  < from: Xray Chest 1 View AP/PA (03.29.24 @ 02:15) >  Impression:    No radiographic evidence of acute cardiopulmonary disease.    < end of copied text >  < from: CT Head No Cont (03.29.24 @ 01:52) >  IMPRESSION:    No acute intracranial pathology.      < end of copied text >    MEDICATIONS  (STANDING):  amLODIPine   Tablet 5 milliGRAM(s) Oral daily  carvedilol 12.5 milliGRAM(s) Oral every 12 hours  chlordiazePOXIDE 25 milliGRAM(s) Oral every 12 hours  chlorhexidine 2% Cloths 1 Application(s) Topical daily  enoxaparin Injectable 40 milliGRAM(s) SubCutaneous every 24 hours  folic acid 1 milliGRAM(s) Oral daily  multivitamin 1 Tablet(s) Oral daily  nicotine - 21 mG/24Hr(s) Patch 1 Patch Transdermal daily  thiamine 100 milliGRAM(s) Oral daily    MEDICATIONS  (PRN):  acetaminophen     Tablet .. 650 milliGRAM(s) Oral every 6 hours PRN Temp greater or equal to 38C (100.4F), Mild Pain (1 - 3)  aluminum hydroxide/magnesium hydroxide/simethicone Suspension 30 milliLiter(s) Oral every 4 hours PRN Dyspepsia  chlordiazePOXIDE 25 milliGRAM(s) Oral every 1 hour PRN Symptom-triggered: each CIWA -Ar score 8 or GREATER  melatonin 3 milliGRAM(s) Oral at bedtime PRN Insomnia  ondansetron Injectable 4 milliGRAM(s) IV Push every 8 hours PRN Nausea and/or Vomiting  sodium chloride 0.65% Nasal 1 Spray(s) Both Nostrils every 2 hours PRN Nasal Congestion    
FLETCHER PERRY  49y  Male      Patient is a 49y old  Male who presents with a chief complaint of alcohol withdrawal (04 Apr 2024 11:17)      INTERVAL HPI/OVERNIGHT EVENTS:  Pt seen and examined with Croatian-speaking PA and Croatian-speaking RN. He is having labile mood and thinks he is admitted to [facility]. Kalyan roa called later in the day for agitation.          Vital Signs Last 24 Hrs  T(C): 36.1 (04 Apr 2024 06:19), Max: 36.4 (03 Apr 2024 18:38)  T(F): 97 (04 Apr 2024 06:19), Max: 97.5 (03 Apr 2024 18:38)  HR: 65 (04 Apr 2024 06:19) (65 - 74)  BP: 155/84 (04 Apr 2024 06:19) (145/86 - 155/84)  BP(mean): --  RR: 16 (04 Apr 2024 06:19) (16 - 16)  SpO2: 99% (03 Apr 2024 18:38) (99% - 99%)        PHYSICAL EXAM:  Gen: NAD, resting in bed  HEENT: Normocephalic, atraumatic  Neck: supple, no lymphadenopathy  CV: Regular rate & regular rhythm  Lungs: CTABL no wheeze  Abdomen: Soft, NTND+ BS present  Ext: Warm, well perfused no CCE  Neuro: non focal, awake, CN II-XII intact   Skin: no rash, no erythema       Consultant(s) Notes Reviewed:  [x ] YES  [ ] NO  Care Discussed with Consultants/Other Providers [ x] YES  [ ] NO    LABS:    04-03    139  |  104  |  12  ----------------------------<  118<H>  3.7   |  26  |  1.3    Ca    9.4      03 Apr 2024 06:22  Phos  5.2     04-03  Mg     2.1     04-03       ASSESSMENT AND PLAN:    49-year-old Croatian-speaking male brought in by hospital security and EMS for agitated behavior outside of the hospital.  Patient was aggressive in ED requiring 4-point restraints and chemical sedation.  Patient reportedly stated to EMS that he was drinking a lot of alcohol prior to coming into the ER.  Reportedly 1 pint of alcohol every day and stopped 2 days ago    alcohol withdrawal / alcoholic ketoacidosis - resolved / suicidal ideation / suspected thiamine and folate deficiency      - unclear if pt is at baseline mental status   - still receiving occasional prn benzos   - lytes improved s/p supplementation   - thiamine and folate   - consulted psych and neurology   - started seroquel per psych recs   - MRI brain ordered per Neuro recs, and video EEG afterwards   - family coming to evaluate pts MS   - DVT prophylaxis     50 minutes total spent today on patient care
FLETCHER PERRY  49y  Male      Patient is a 49y old  Male who presents with a chief complaint of alcohol withdrawal (04 Apr 2024 16:59)      INTERVAL HPI/OVERNIGHT EVENTS:  Pt seen and examined. Pt has had multiple episodes of agitation requiring security/code gray called overhead, also exhibiting labile mood with periods of being tearful as well as overt paranoia.        Vital Signs Last 24 Hrs  T(C): 35.6 (04 Apr 2024 20:53), Max: 35.6 (04 Apr 2024 20:53)  T(F): 96 (04 Apr 2024 20:53), Max: 96 (04 Apr 2024 20:53)  HR: 65 (04 Apr 2024 22:10) (58 - 70)  BP: 177/96 (04 Apr 2024 22:10) (156/92 - 177/96)  BP(mean): --  RR: 16 (04 Apr 2024 22:10) (16 - 18)  SpO2: 98% (04 Apr 2024 20:53) (98% - 98%)    Parameters below as of 04 Apr 2024 20:53  Patient On (Oxygen Delivery Method): room air        PHYSICAL EXAM:  Gen: NAD, resting in bed  HEENT: Normocephalic, atraumatic  Neck: supple, no lymphadenopathy  CV: Regular rate & regular rhythm  Lungs: CTABL no wheeze  Abdomen: Soft, NTND+ BS present  Ext: Warm, well perfused no CCE  Neuro: non focal, awake, CN II-XII intact   Skin: no rash, no erythema  Psych: +paranoid, +delusional, +labile mood    Consultant(s) Notes Reviewed:  [x ] YES  [ ] NO  Care Discussed with Consultants/Other Providers [ x] YES  [ ] NO       ASSESSMENT AND PLAN:    49-year-old Wallisian-speaking male brought in by hospital security and EMS for agitated behavior outside of the hospital.  Patient was aggressive in ED requiring 4-point restraints and chemical sedation.  Patient reportedly stated to EMS that he was drinking a lot of alcohol prior to coming into the ER.  Reportedly 1 pint of alcohol every day and stopped 2 days ago    alcohol withdrawal / alcoholic ketoacidosis - resolved / suicidal ideation / suspected thiamine and folate deficiency / concern for acute psychosis/adelaida/bipolar disorder      - consulted psych and neurology   - haldol 5mg bid ordered for psychosis per Psych   - for acute agitation IM haldol 5mg/ativan 2mg per Psych    - discussed with Endocrinology for reported history of adrenal tumor and they said there is no role for inpatient workup, in acute setting likely cortisol level will be high. will get CT a/p to look for evidence of tumor.    - MRI brain ordered per Neuro recs, and video EEG afterwards, however pt noot cooperating    - spouse involved in care   - DVT prophylaxis     
FLETCHER PERRY  49y  Male      Patient is a 49y old  Male who presents with a chief complaint of alcohol withdrawal (05 Apr 2024 16:42)      INTERVAL HPI/OVERNIGHT EVENTS:  Pt seen and examined. He had his MRI at [place] and came back to his room. He is seen with his wife. He is calm and feels better. No major episode of agitation requiring chemical or physical restraints.        Vital Signs Last 24 Hrs  T(C): 36.3 (06 Apr 2024 13:38), Max: 36.3 (06 Apr 2024 13:38)  T(F): 97.4 (06 Apr 2024 13:38), Max: 97.4 (06 Apr 2024 13:38)  HR: 68 (06 Apr 2024 13:38) (53 - 68)  BP: 171/94 (06 Apr 2024 13:38) (110/60 - 171/94)  BP(mean): --  RR: 16 (06 Apr 2024 13:38) (16 - 18)  SpO2: 98% (06 Apr 2024 13:38) (98% - 98%)    Parameters below as of 05 Apr 2024 21:00  Patient On (Oxygen Delivery Method): room air        PHYSICAL EXAM:  Gen: NAD, resting in bed  HEENT: Normocephalic, atraumatic  Neck: supple, no lymphadenopathy  CV: Regular rate & regular rhythm  Lungs: CTABL no wheeze  Abdomen: Soft, NTND+ BS present  Ext: Warm, well perfused no CCE  Neuro: non focal, awake, CN II-XII intact   Skin: no rash, no erythema    Consultant(s) Notes Reviewed:  [x ] YES  [ ] NO  Care Discussed with Consultants/Other Providers [ x] YES  [ ] NO      ASSESSMENT AND PLAN:    49-year-old Austrian-speaking male brought in by hospital security and EMS for agitated behavior outside of the hospital.  Patient was aggressive in ED requiring 4-point restraints and chemical sedation.  Patient reportedly stated to EMS that he was drinking a lot of alcohol prior to coming into the ER.  Reportedly 1 pint of alcohol every day and stopped 2 days ago    alcohol withdrawal / alcoholic ketoacidosis - resolved / suicidal ideation / suspected thiamine and folate deficiency / concern for acute psychosis/adelaida/bipolar disorder      - consulted psych and neurology, appreciate recs  - follow up brain MRI   - haldol 5mg bid ordered for psychosis per Psych, symptoms improving   - for acute agitation IM haldol 5mg/ativan 2mg per Psych    - discussed with Endocrinology for reported history of adrenal tumor and they said there is no role for inpatient workup, in acute setting likely cortisol level will be high.   - CT a/p obtained to look for tumor, follow up read    - spouse involved in care   - DVT prophylaxis     Dispo: IPP vs Home 
[facility] day: 4    HPI:   Patient is a 49-year-old male [place] citizen-speaking brought in by hospital security and EMS for agitated behavior outside of the hospital.  Patient was aggressive in ED requiring 4-point restraints chemical sedation.  Patient reportedly stated to EMS that he was drinking a lot of alcohol prior to coming into the ER.  Reportedly 1 pint of alcohol every day never stopped 2 days ago.  Patient currently denies abusing alcohol stating he drinks 1 bottle of beer a month. Patient states last drink was about 1 week ago. Patient denies hx of tremors or seizures. Patient reports visual hallucinations in dreams. Patient reported suicidal ideations in ED but currently denies any. Patient states has a hx of depression and suicidal ideations.    Subjective:   Complaints: none  Overnight events: patient tried to elope ??? he does not recall      Objective:   Vital Signs Last 24 Hrs  T(C): 36.7 (02 Apr 2024 07:40), Max: 36.7 (02 Apr 2024 07:25)  T(F): 98 (02 Apr 2024 07:40), Max: 98 (02 Apr 2024 07:25)  HR: 58 (02 Apr 2024 07:40) (58 - 65)  BP: 130/85 (02 Apr 2024 07:40) (122/80 - 149/103)  BP(mean): 101 (02 Apr 2024 07:25) (101 - 122)  RR: 16 (02 Apr 2024 07:40) (10 - 19)  SpO2: 100% (02 Apr 2024 07:40) (99% - 100%)    Parameters below as of 02 Apr 2024 07:40  Patient On (Oxygen Delivery Method): room air      PHYSICAL EXAM  GENERAL: Well developed, well nourished on Room air  HEENT: Normocephalic, atraumatic  PULMONARY/CARDIOVASCULAR: CTA, no ronchi or wheezes. Normal S1S2 with RRR, no murmurs  GASTROINTESTINAL: Soft, non-tender, non-distended, no guarding.  RENAL: no cva tenderness; no wynn   SKIN/EXTREMITIES: no LE edema   NEUROLOGIC/MUSCULOSKELETAL: AOx4, grossly moving all extremities, no focal deficits.    MEDICATIONS  acetaminophen     Tablet .. 650 milliGRAM(s) Oral every 6 hours PRN  aluminum hydroxide/magnesium hydroxide/simethicone Suspension 30 milliLiter(s) Oral every 4 hours PRN  amLODIPine   Tablet 5 milliGRAM(s) Oral daily  carvedilol 12.5 milliGRAM(s) Oral every 12 hours  chlordiazePOXIDE 25 milliGRAM(s) Oral every 12 hours  chlordiazePOXIDE 25 milliGRAM(s) Oral every 1 hour PRN  chlorhexidine 2% Cloths 1 Application(s) Topical daily  dextrose 5% + sodium chloride 0.45%. 1000 milliLiter(s) IV Continuous <Continuous>  enoxaparin Injectable 40 milliGRAM(s) SubCutaneous every 24 hours  folic acid 1 milliGRAM(s) Oral daily  melatonin 3 milliGRAM(s) Oral at bedtime PRN  multivitamin 1 Tablet(s) Oral daily  nicotine - 21 mG/24Hr(s) Patch 1 Patch Transdermal daily  ondansetron Injectable 4 milliGRAM(s) IV Push every 8 hours PRN  thiamine 100 milliGRAM(s) Oral daily      Labs:                        14.8   8.97  )-----------( 172      ( 01 Apr 2024 05:52 )             40.3     04-01    140  |  103  |  12  ----------------------------<  130<H>  3.7   |  25  |  1.3    Ca    9.4      01 Apr 2024 05:52  Mg     2.2     04-01    TPro  5.8<L>  /  Alb  4.1  /  TBili  0.7  /  DBili  x   /  AST  15  /  ALT  27  /  AlkPhos  71  04-01  
 Patient seen and evaluated this am, calm with no complaints       T(F): 97 (04-01-24 @ 15:09), Max: 97.6 (03-31-24 @ 23:00)  HR: 63 (04-01-24 @ 07:00)  BP: 141/84 (04-01-24 @ 15:09)  RR: 18 (04-01-24 @ 15:09)  SpO2: 99% (04-01-24 @ 05:57) (99% - 99%)    PHYSICAL EXAM:  GENERAL: NAD  HEAD:  Atraumatic, Normocephalic  EYES: EOMI, PERRLA, conjunctiva and sclera clear  NERVOUS SYSTEM:  Alert & Oriented X3, no focal deficits   CHEST/LUNG: Clear to percussion bilaterally; No rales, rhonchi, wheezing, or rubs  HEART: Regular rate and rhythm; No murmurs, rubs, or gallops  ABDOMEN: Soft, Nontender, Nondistended; Bowel sounds present  EXTREMITIES:  2+ Peripheral Pulses, No clubbing, cyanosis, or edema    LABS  04-01    140  |  103  |  12  ----------------------------<  130<H>  3.7   |  25  |  1.3    Ca    9.4      01 Apr 2024 05:52  Mg     2.2     04-01    TPro  5.8<L>  /  Alb  4.1  /  TBili  0.7  /  DBili  x   /  AST  15  /  ALT  27  /  AlkPhos  71  04-01                          14.8   8.97  )-----------( 172      ( 01 Apr 2024 05:52 )             40.3       Culture Results:   No growth (03-29-24)    RADIOLOGY  < from: CT Head No Cont (03.29.24 @ 01:52) >  IMPRESSION:    No acute intracranial pathology.    < end of copied text >    MEDICATIONS  (STANDING):  amLODIPine   Tablet 5 milliGRAM(s) Oral daily  carvedilol 12.5 milliGRAM(s) Oral every 12 hours  chlordiazePOXIDE 25 milliGRAM(s) Oral every 8 hours  chlorhexidine 2% Cloths 1 Application(s) Topical daily  dextrose 5% + sodium chloride 0.45%. 1000 milliLiter(s) (125 mL/Hr) IV Continuous <Continuous>  enoxaparin Injectable 40 milliGRAM(s) SubCutaneous every 24 hours  folic acid 1 milliGRAM(s) Oral daily  LORazepam   Injectable 2 milliGRAM(s) IV Push once  multivitamin 1 Tablet(s) Oral daily  nicotine - 21 mG/24Hr(s) Patch 1 Patch Transdermal daily  thiamine 100 milliGRAM(s) Oral daily    MEDICATIONS  (PRN):  acetaminophen     Tablet .. 650 milliGRAM(s) Oral every 6 hours PRN Temp greater or equal to 38C (100.4F), Mild Pain (1 - 3)  aluminum hydroxide/magnesium hydroxide/simethicone Suspension 30 milliLiter(s) Oral every 4 hours PRN Dyspepsia  LORazepam     Tablet 2 milliGRAM(s) Oral every 2 hours PRN Symptom-triggered 2 point increase in CIWA-Ar  LORazepam   Injectable 2 milliGRAM(s) IV Push every 1 hour PRN Symptom-triggered: each CIWA -Ar score 8 or GREATER  melatonin 3 milliGRAM(s) Oral at bedtime PRN Insomnia  ondansetron Injectable 4 milliGRAM(s) IV Push every 8 hours PRN Nausea and/or Vomiting    
FLTECHER PERRY  49y  Male      Patient is a 49y old  Male who presents with a chief complaint of alcohol withdrawal (06 Apr 2024 14:37)      INTERVAL HPI/OVERNIGHT EVENTS:  Pt seen and examined.   code gray called overnight due to agitation  He complains of nausea and indigestion this AM      Vital Signs Last 24 Hrs  T(C): 36.3 (07 Apr 2024 05:00), Max: 36.6 (06 Apr 2024 15:33)  T(F): 97.4 (07 Apr 2024 05:00), Max: 97.8 (06 Apr 2024 15:33)  HR: 65 (07 Apr 2024 05:00) (54 - 68)  BP: 160/90 (07 Apr 2024 05:00) (141/88 - 173/93)  BP(mean): --  RR: 18 (07 Apr 2024 05:00) (16 - 18)  SpO2: 98% (07 Apr 2024 05:00) (98% - 99%)    Parameters below as of 07 Apr 2024 05:00  Patient On (Oxygen Delivery Method): room air        PHYSICAL EXAM:  Gen: NAD, resting in bed  HEENT: Normocephalic, atraumatic  Neck: supple, no lymphadenopathy  CV: Regular rate & regular rhythm  Lungs: CTABL no wheeze  Abdomen: Soft, NTND+ BS present  Ext: Warm, well perfused no CCE  Neuro: non focal, awake, CN II-XII intact   Skin: no rash, no erythema  Psych: no SI, HI, Hallucination     Consultant(s) Notes Reviewed:  [x ] YES  [ ] NO  Care Discussed with Consultants/Other Providers [ x] YES  [ ] NO         RADIOLOGY & ADDITIONAL TESTS:    Imaging Personally Reviewed:  [ ] YES  [ ] NO     ASSESSMENT AND PLAN:    49-year-old Malagasy-speaking male brought in by hospital security and EMS for agitated behavior outside of the hospital.  Patient was aggressive in ED requiring 4-point restraints and chemical sedation.  Patient reportedly stated to EMS that he was drinking a lot of alcohol prior to coming into the ER.  Reportedly 1 pint of alcohol every day and stopped 2 days ago    alcohol withdrawal / alcoholic ketoacidosis - resolved / suicidal ideation / suspected thiamine and folate deficiency / concern for acute psychosis/adelaida/bipolar disorder      - consulted psych and neurology, appreciate recs  -  brain MRI: unremarkable   - haldol 5mg bid ordered for psychosis per Psych, symptoms improving   - for acute agitation IM haldol 5mg/ativan 2mg per Psych    - discussed with Endocrinology for reported history of adrenal tumor and they said there is no role for inpatient workup, in acute setting likely cortisol level will be high.   - CT a/p shows 1.5cm left adrenal nodule, will consult Endo to see if further workup is needed otherwise can be discharged to [facility]   - spouse involved in care   - DVT prophylaxis     Dispo: IPP         
    FLETCHER PERRY  49y, Male  Allergy: Allergy Status Unknown      CHIEF COMPLAINT: alcohol withdrawal (30 Mar 2024 06:52)      HPI:  Patient is a 49-year-old male Japanese-speaking brought in by hospital security and EMS for agitated behavior outside of the hospital.  Patient was aggressive in ED requiring 4-point restraints chemical sedation.  Patient reportedly stated to EMS that he was drinking a lot of alcohol prior to coming into the ER.  Reportedly 1 pint of alcohol every day never stopped 2 days ago.  Patient currently denies abusing alcohol stating he drinks 1 bottle of beer a month. Patient states last drink was about 1 week ago. Patient denies hx of tremors or seizures. Patient reports visual hallucinations in dreams. Patient reported suicidal ideations in ED but currently denies any. Patient states has a hx of depression and suicidal ideations.  (29 Mar 2024 15:53)    HPI:    FAMILY HISTORY:  No pertinent family history in first degree relatives      PAST MEDICAL & SURGICAL HISTORY:  Alcohol abuse      H/O: depression      Suicidal ideations      HTN (hypertension)      History of adenoma of adrenal gland      No significant past surgical history          SOCIAL HISTORY  Social History:  Reports Recently moved from [place] with his son  Living with a friend (29 Mar 2024 15:53)      Home Medications:      ROS  General: Denies fevers, chills, nightsweats, weight loss  HEENT: Denies headache, rhinorrhea, sore throat, eye pain  CV: Denies CP, palpitations  PULM: Denies SOB, cough  GI: Denies abdominal pain, diarrhea  : Denies dysuria, hematuria  MSK: Denies arthralgias  SKIN: Denies rash   NEURO: Denies paresthesias, weakness      VITALS:  T(F): 97.1, Max: 97.7 (03-29-24 @ 23:33)  HR: 83  BP: 166/95  RR: 18Vital Signs Last 24 Hrs  T(C): 36.2 (30 Mar 2024 07:00), Max: 36.5 (29 Mar 2024 23:33)  T(F): 97.1 (30 Mar 2024 07:00), Max: 97.7 (29 Mar 2024 23:33)  HR: 83 (30 Mar 2024 07:00) (60 - 97)  BP: 166/95 (30 Mar 2024 07:00) (108/60 - 179/105)  BP(mean): 113 (30 Mar 2024 05:44) (77 - 136)  RR: 18 (30 Mar 2024 07:00) (14 - 20)  SpO2: 98% (29 Mar 2024 23:33) (96% - 98%)    Parameters below as of 29 Mar 2024 23:33  Patient On (Oxygen Delivery Method): room air        PHYSICAL EXAM:  Gen: NAD, resting in bed  HEENT: Normocephalic, atraumatic  Neck: supple, no lymphadenopathy  CV: Regular rate & regular rhythm  Lungs: CTABL no wheeze  Abdomen: Soft, NTND+ BS present  Ext: Warm, well perfused no CCE  Neuro: non focal, awake, CN II-XII intact   Skin: no rash, no erythema      TESTS & MEASUREMENTS:                        16.3   8.88  )-----------( 209      ( 30 Mar 2024 05:56 )             44.4     03-30    141  |  104  |  7<L>  ----------------------------<  142<H>  3.9   |  26  |  1.1    Ca    9.4      30 Mar 2024 05:56  Phos  3.0     03-30  Mg     2.3     03-30    TPro  7.0  /  Alb  4.6  /  TBili  1.1  /  DBili  x   /  AST  28  /  ALT  27  /  AlkPhos  83  03-30      LIVER FUNCTIONS - ( 30 Mar 2024 05:56 )  Alb: 4.6 g/dL / Pro: 7.0 g/dL / ALK PHOS: 83 U/L / ALT: 27 U/L / AST: 28 U/L / GGT: x           Urinalysis Basic - ( 30 Mar 2024 05:56 )    Color: x / Appearance: x / SG: x / pH: x  Gluc: 142 mg/dL / Ketone: x  / Bili: x / Urobili: x   Blood: x / Protein: x / Nitrite: x   Leuk Esterase: x / RBC: x / WBC x   Sq Epi: x / Non Sq Epi: x / Bacteria: x        COVID-19 PCR: NotDetec (29 Mar 2024 03:45)    Blood Gas Venous - Lactate: 0.8 mmol/L (03-29-24 @ 17:37)    QRS axis to [] ° and NSR at a rate of [] BPM. There was no atrial enlargement. There was no ventricular hypertrophy. There were no ST-T changes and all intervals were normal.      INFECTIOUS DISEASES TESTING      RADIOLOGY & ADDITIONAL TESTS:  I have personally reviewed the last Chest xray  CXR  Xray Chest 1 View AP/PA:   ACC: [id number] EXAM:  XR CHEST 1 VIEW   ORDERED BY: TUYET LYON     PROCEDURE DATE:  03/29/2024          INTERPRETATION:  Clinical History / Reason for exam: Baseline    Comparison : Chest radiograph None.    Technique/Positioning: AP chest.    Findings:    Support devices: None.    Cardiac/mediastinum/hilum: Unremarkable.    Lung parenchyma/Pleura: Within normal limits.    Skeleton/soft tissues: Unremarkable.    Impression:    No radiographic evidence of acute cardiopulmonary disease.        --- Endof Report ---            ARTI RUSSELL MD; Attending Radiologist  This document has been electronically signed. Mar 29 2024  8:30AM (03-29-24 @ 02:15)      CT      CARDIOLOGY TESTING  12 Lead ECG:   Ventricular Rate 55 BPM    Atrial Rate 55 BPM    P-R Interval 212 ms    QRS Duration 88 ms    Q-T Interval 452 ms    QTC Calculation(Bazett) 432 ms    P Axis 35 degrees    R Axis 56 degrees    T Axis 58 degrees    Diagnosis Line Sinus bradycardia with 1st degree A-V block  Otherwise normal ECG    Confirmed by SHIRAZ DIAS MD ([id number]) on 3/29/2024 6:38:02 AM (03-29-24 @ 04:48)      MEDICATIONS  (STANDING):  chlordiazePOXIDE 25 milliGRAM(s) Oral every 8 hours  chlorhexidine 2% Cloths 1 Application(s) Topical daily  chlorhexidine 4% Liquid 1 Application(s) Topical <User Schedule>  dextrose 5% + sodium chloride 0.45%. 1000 milliLiter(s) (125 mL/Hr) IV Continuous <Continuous>  enoxaparin Injectable 40 milliGRAM(s) SubCutaneous every 24 hours  folic acid 1 milliGRAM(s) Oral daily  multivitamin 1 Tablet(s) Oral daily  nicotine - 21 mG/24Hr(s) Patch 1 Patch Transdermal daily  thiamine 100 milliGRAM(s) Oral daily    MEDICATIONS  (PRN):  acetaminophen     Tablet .. 650 milliGRAM(s) Oral every 6 hours PRN Temp greater or equal to 38C (100.4F), Mild Pain (1 - 3)  aluminum hydroxide/magnesium hydroxide/simethicone Suspension 30 milliLiter(s) Oral every 4 hours PRN Dyspepsia  LORazepam     Tablet 2 milliGRAM(s) Oral every 2 hours PRN Symptom-triggered 2 point increase in CIWA-Ar  LORazepam   Injectable 2 milliGRAM(s) IV Push every 1 hour PRN Symptom-triggered: each CIWA -Ar score 8 or GREATER  melatonin 3 milliGRAM(s) Oral at bedtime PRN Insomnia  ondansetron Injectable 4 milliGRAM(s) IV Push every 8 hours PRN Nausea and/or Vomiting      ANTIBIOTICS:      All available historical data has been reviewed    ASSESSMENT  49y M admitted with Other acidosis        PROBLEMS  
 Patient seen and evaluated this am, calm in bed, no complaints      T(F): 96.2 (03-31-24 @ 07:00), Max: 96.8 (03-30-24 @ 23:08)  HR: 85 (03-31-24 @ 08:00)  BP: 141/95 (03-31-24 @ 08:00)  RR: 15 (03-31-24 @ 08:00)  SpO2: 98% (03-31-24 @ 08:00) (98% - 99%)    PHYSICAL EXAM:  GENERAL: NAD  HEAD:  Atraumatic, Normocephalic  EYES: EOMI, PERRLA, conjunctiva and sclera clear  NERVOUS SYSTEM:  Alert & Oriented X3, no focal deficits   CHEST/LUNG: Clear to percussion bilaterally; No rales, rhonchi, wheezing, or rubs  HEART: Regular rate and rhythm; No murmurs, rubs, or gallops  ABDOMEN: Soft, Nontender, Nondistended; Bowel sounds present  EXTREMITIES:  2+ Peripheral Pulses, No clubbing, cyanosis, or edema    LABS  03-31    141  |  103  |  11  ----------------------------<  119<H>  3.5   |  26  |  1.2    Ca    9.5      31 Mar 2024 05:55  Phos  3.0     03-30  Mg     2.1     03-31    TPro  6.0  /  Alb  4.3  /  TBili  1.1  /  DBili  x   /  AST  17  /  ALT  23  /  AlkPhos  74  03-31                          15.3   9.30  )-----------( 162      ( 31 Mar 2024 05:55 )             42.3         CARDIAC ENZYMES  Creatine Kinase, Serum: 107 (03-29 @ 01:42)      Culture Results:   No growth (03-29-24)    RADIOLOGY  < from: Xray Chest 1 View AP/PA (03.29.24 @ 02:15) >  Impression:    No radiographic evidence of acute cardiopulmonary disease.      < end of copied text >  < from: CT Head No Cont (03.29.24 @ 01:52) >    IMPRESSION:    No acute intracranial pathology.    < end of copied text >    MEDICATIONS  (STANDING):  amLODIPine   Tablet 5 milliGRAM(s) Oral daily  chlordiazePOXIDE 25 milliGRAM(s) Oral every 8 hours  chlorhexidine 2% Cloths 1 Application(s) Topical daily  chlorhexidine 4% Liquid 1 Application(s) Topical <User Schedule>  dextrose 5% + sodium chloride 0.45%. 1000 milliLiter(s) (125 mL/Hr) IV Continuous <Continuous>  enoxaparin Injectable 40 milliGRAM(s) SubCutaneous every 24 hours  folic acid 1 milliGRAM(s) Oral daily  multivitamin 1 Tablet(s) Oral daily  nicotine - 21 mG/24Hr(s) Patch 1 Patch Transdermal daily  thiamine 100 milliGRAM(s) Oral daily    MEDICATIONS  (PRN):  acetaminophen     Tablet .. 650 milliGRAM(s) Oral every 6 hours PRN Temp greater or equal to 38C (100.4F), Mild Pain (1 - 3)  aluminum hydroxide/magnesium hydroxide/simethicone Suspension 30 milliLiter(s) Oral every 4 hours PRN Dyspepsia  LORazepam     Tablet 2 milliGRAM(s) Oral every 2 hours PRN Symptom-triggered 2 point increase in CIWA-Ar  LORazepam   Injectable 2 milliGRAM(s) IV Push every 1 hour PRN Symptom-triggered: each CIWA -Ar score 8 or GREATER  melatonin 3 milliGRAM(s) Oral at bedtime PRN Insomnia  ondansetron Injectable 4 milliGRAM(s) IV Push every 8 hours PRN Nausea and/or Vomiting    
[facility] day: 5    HPI:   Patient is a 49-year-old male Tunisian-speaking brought in by hospital security and EMS for agitated behavior outside of the hospital.  Patient was aggressive in ED requiring 4-point restraints chemical sedation.  Patient reportedly stated to EMS that he was drinking a lot of alcohol prior to coming into the ER.  Reportedly 1 pint of alcohol every day never stopped 2 days ago.  Patient currently denies abusing alcohol stating he drinks 1 bottle of beer a month. Patient states last drink was about 1 week ago. Patient denies hx of tremors or seizures. Patient reports visual hallucinations in dreams. Patient reported suicidal ideations in ED but currently denies any. Patient states has a hx of depression and suicidal ideations.    Subjective:   Complaints: none  Overnight events: agitation requiring ativan     Objective:   Vital Signs Last 24 Hrs  T(C): 36 (03 Apr 2024 08:47), Max: 36.3 (02 Apr 2024 15:15)  T(F): 96.8 (03 Apr 2024 08:47), Max: 97.3 (02 Apr 2024 15:15)  HR: 64 (03 Apr 2024 06:50) (52 - 73)  BP: 113/72 (03 Apr 2024 06:50) (113/72 - 147/91)  BP(mean): 87 (03 Apr 2024 06:50) (87 - 112)  RR: 11 (03 Apr 2024 08:47) (11 - 24)  SpO2: 100% (03 Apr 2024 06:50) (99% - 100%)    Parameters below as of 03 Apr 2024 06:50  Patient On (Oxygen Delivery Method): room air    PHYSICAL EXAM  GENERAL: Well developed, well nourished on Room air  HEENT: Normocephalic, atraumatic  PULMONARY/CARDIOVASCULAR: CTA, no ronchi or wheezes. Normal S1S2 with RRR, no murmurs  GASTROINTESTINAL: Soft, non-tender, non-distended, no guarding.  RENAL: no cva tenderness; no wynn   SKIN/EXTREMITIES: no LE edema   NEUROLOGIC/MUSCULOSKELETAL: AOx4, grossly moving all extremities, no focal deficits.    MEDICATIONS  acetaminophen     Tablet .. 650 milliGRAM(s) Oral every 6 hours PRN  aluminum hydroxide/magnesium hydroxide/simethicone Suspension 30 milliLiter(s) Oral every 4 hours PRN  amLODIPine   Tablet 5 milliGRAM(s) Oral daily  carvedilol 12.5 milliGRAM(s) Oral every 12 hours  chlordiazePOXIDE 25 milliGRAM(s) Oral every 12 hours  chlordiazePOXIDE 25 milliGRAM(s) Oral every 1 hour PRN  chlorhexidine 2% Cloths 1 Application(s) Topical daily  dextrose 5% + sodium chloride 0.45%. 1000 milliLiter(s) IV Continuous <Continuous>  enoxaparin Injectable 40 milliGRAM(s) SubCutaneous every 24 hours  folic acid 1 milliGRAM(s) Oral daily  melatonin 3 milliGRAM(s) Oral at bedtime PRN  multivitamin 1 Tablet(s) Oral daily  nicotine - 21 mG/24Hr(s) Patch 1 Patch Transdermal daily  ondansetron Injectable 4 milliGRAM(s) IV Push every 8 hours PRN  thiamine 100 milliGRAM(s) Oral daily      Labs:             04-03    139  |  104  |  12  ----------------------------<  118<H>  3.7   |  26  |  1.3    Ca    9.4      03 Apr 2024 06:22  Phos  5.2     04-03  Mg     2.1     04-03    TPro  5.9<L>  /  Alb  4.4  /  TBili  0.4  /  DBili  <0.2  /  AST  15  /  ALT  27  /  AlkPhos  75  04-02  
[facility] day: 3    HPI:  Patient is a 49-year-old male [place]ib[place]ian-speaking brought in by hospital security and EMS for agitated behavior outside of the hospital.  Patient was aggressive in ED requiring 4-point restraints chemical sedation.  Patient reportedly stated to EMS that he was drinking a lot of alcohol prior to coming into the ER.  Reportedly 1 pint of alcohol every day never stopped 2 days ago.  Patient currently denies abusing alcohol stating he drinks 1 bottle of beer a month. Patient states last drink was about 1 week ago. Patient denies hx of tremors or seizures. Patient reports visual hallucinations in dreams. Patient reported suicidal ideations in ED but currently denies any. Patient states has a hx of depression and suicidal ideations.  (29 Mar 2024 15:53)      Subjective:   Complaints: none  Overnight events: none      Objective:   T(C): 36 (04-01-24 @ 07:00), Max: 36.4 (03-31-24 @ 23:00)  HR: 63 (04-01-24 @ 07:00) (62 - 90)  BP: 122/76 (04-01-24 @ 07:00) (119/81 - 195/114)  RR: 20 (04-01-24 @ 05:57) (18 - 20)  SpO2: 99% (04-01-24 @ 05:57) (98% - 99%)    Acc I&O      PHYSICAL EXAM  GENERAL: Well developed, well nourished on Room air  HEENT: Normocephalic, atraumatic  PULMONARY/CARDIOVASCULAR: CTA, no ronchi or wheezes. Normal S1S2 with RRR, no murmurs  GASTROINTESTINAL: Soft, non-tender, non-distended, no guarding.  RENAL: no cva tenderness; no wynn   SKIN/EXTREMITIES: no LE edema   NEUROLOGIC/MUSCULOSKELETAL: AOx2-3, grossly moving all extremities, no focal deficits.    MEDICATIONS  acetaminophen     Tablet .. 650 milliGRAM(s) Oral every 6 hours PRN  aluminum hydroxide/magnesium hydroxide/simethicone Suspension 30 milliLiter(s) Oral every 4 hours PRN  amLODIPine   Tablet 5 milliGRAM(s) Oral daily  carvedilol 12.5 milliGRAM(s) Oral every 12 hours  chlordiazePOXIDE 25 milliGRAM(s) Oral every 8 hours  chlorhexidine 2% Cloths 1 Application(s) Topical daily  dextrose 5% + sodium chloride 0.45%. 1000 milliLiter(s) IV Continuous <Continuous>  enoxaparin Injectable 40 milliGRAM(s) SubCutaneous every 24 hours  folic acid 1 milliGRAM(s) Oral daily  LORazepam     Tablet 2 milliGRAM(s) Oral every 2 hours PRN  LORazepam   Injectable 2 milliGRAM(s) IV Push every 1 hour PRN  melatonin 3 milliGRAM(s) Oral at bedtime PRN  multivitamin 1 Tablet(s) Oral daily  nicotine - 21 mG/24Hr(s) Patch 1 Patch Transdermal daily  ondansetron Injectable 4 milliGRAM(s) IV Push every 8 hours PRN  thiamine 100 milliGRAM(s) Oral daily      Labs:                        14.8   8.97  )-----------( 172      ( 01 Apr 2024 05:52 )             40.3     04-01    140  |  103  |  12  ----------------------------<  130<H>  3.7   |  25  |  1.3    Ca    9.4      01 Apr 2024 05:52  Mg     2.2     04-01    TPro  5.8<L>  /  Alb  4.1  /  TBili  0.7  /  DBili  x   /  AST  15  /  ALT  27  /  AlkPhos  71  04-01  
 Patient seen and evaluated this am, calm, no complaints       T(F): 97.3 (04-02-24 @ 15:15), Max: 98 (04-02-24 @ 07:25)  HR: 62 (04-02-24 @ 15:15)  BP: 141/90 (04-02-24 @ 15:15)  RR: 14 (04-02-24 @ 15:15)  SpO2: 100% (04-02-24 @ 07:40) (99% - 100%)    PHYSICAL EXAM:  GENERAL: NAD  HEAD:  Atraumatic, Normocephalic  EYES: EOMI, PERRLA, conjunctiva and sclera clear  NERVOUS SYSTEM:  no focal deficits   CHEST/LUNG: Clear to percussion bilaterally; No rales, rhonchi, wheezing, or rubs  HEART: Regular rate and rhythm; No murmurs, rubs, or gallops  ABDOMEN: Soft, Nontender, Nondistended; Bowel sounds present  EXTREMITIES:  2+ Peripheral Pulses, No clubbing, cyanosis, or edema    LABS  04-02    139  |  102  |  14  ----------------------------<  153<H>  3.7   |  25  |  1.0    Ca    9.4      02 Apr 2024 15:50  Phos  4.4     04-02  Mg     2.2     04-02    TPro  5.9<L>  /  Alb  4.4  /  TBili  0.4  /  DBili  <0.2  /  AST  15  /  ALT  27  /  AlkPhos  75  04-02                          14.8   8.97  )-----------( 172      ( 01 Apr 2024 05:52 )             40.3       Culture Results:   No growth (03-29-24)    RADIOLOGY  < from: Xray Chest 1 View AP/PA (03.29.24 @ 02:15) >  Impression:    No radiographic evidence of acute cardiopulmonary disease.      < end of copied text >    MEDICATIONS  (STANDING):  amLODIPine   Tablet 5 milliGRAM(s) Oral daily  carvedilol 12.5 milliGRAM(s) Oral every 12 hours  chlordiazePOXIDE 25 milliGRAM(s) Oral every 12 hours  chlorhexidine 2% Cloths 1 Application(s) Topical daily  enoxaparin Injectable 40 milliGRAM(s) SubCutaneous every 24 hours  folic acid 1 milliGRAM(s) Oral daily  multivitamin 1 Tablet(s) Oral daily  nicotine - 21 mG/24Hr(s) Patch 1 Patch Transdermal daily  thiamine 100 milliGRAM(s) Oral daily    MEDICATIONS  (PRN):  acetaminophen     Tablet .. 650 milliGRAM(s) Oral every 6 hours PRN Temp greater or equal to 38C (100.4F), Mild Pain (1 - 3)  aluminum hydroxide/magnesium hydroxide/simethicone Suspension 30 milliLiter(s) Oral every 4 hours PRN Dyspepsia  chlordiazePOXIDE 25 milliGRAM(s) Oral every 1 hour PRN Symptom-triggered: each CIWA -Ar score 8 or GREATER  melatonin 3 milliGRAM(s) Oral at bedtime PRN Insomnia  ondansetron Injectable 4 milliGRAM(s) IV Push every 8 hours PRN Nausea and/or Vomiting    
Patient is a 49y old  Male who presents with a chief complaint of alcohol withdrawal (29 Mar 2024 15:53)        Over Night Events:    remains confused, on 1:1 monitoring    ROS:     All ROS are negative except HPI           PHYSICAL EXAM    ICU Vital Signs Last 24 Hrs  T(C): 36.5 (29 Mar 2024 23:33), Max: 36.5 (29 Mar 2024 23:33)  T(F): 97.7 (29 Mar 2024 23:33), Max: 97.7 (29 Mar 2024 23:33)  HR: 97 (30 Mar 2024 05:44) (60 - 97)  BP: 139/95 (30 Mar 2024 05:44) (108/60 - 179/105)  BP(mean): 113 (30 Mar 2024 05:44) (77 - 136)  ABP: --  ABP(mean): --  RR: 19 (30 Mar 2024 05:44) (14 - 20)  SpO2: 98% (29 Mar 2024 23:33) (96% - 100%)    O2 Parameters below as of 29 Mar 2024 23:33  Patient On (Oxygen Delivery Method): room air            Constitutional: no acute distress, well nourished well developed  Neuro: moving all 4 limbs spontaneously, no facial droop or dysarthria  HEENT: NCAT, anicteric  Neck: no visible lymphadenopathy or goiter  Pulm: no respiratory distress. clear to auscultation bilaterally  Cardiac: extremities appear pink and well-perfused.  regular rhythm and rate, no murmur detected  Abdomen: non-distended  Extremities: no peripheral edema      03-29-24 @ 07:01  -  03-30-24 @ 06:53  --------------------------------------------------------  IN:    dextrose 5% + sodium chloride 0.45%: 1375 mL    Oral Fluid: 600 mL  Total IN: 1975 mL    OUT:  Total OUT: 0 mL    Total NET: 1975 mL          LABS:                            17.4   19.01 )-----------( 235      ( 29 Mar 2024 01:42 )             48.3                                               03-29    143  |  107  |  9<L>  ----------------------------<  122<H>  3.4<L>   |  26  |  0.9    Ca    8.6      29 Mar 2024 18:40  Phos  3.2     03-29  Mg     2.2     03-29    TPro  6.3  /  Alb  4.2  /  TBili  1.0  /  DBili  0.2  /  AST  25  /  ALT  22  /  AlkPhos  79  03-29                                             Urinalysis Basic - ( 29 Mar 2024 18:40 )    Color: x / Appearance: x / SG: x / pH: x  Gluc: 122 mg/dL / Ketone: x  / Bili: x / Urobili: x   Blood: x / Protein: x / Nitrite: x   Leuk Esterase: x / RBC: x / WBC x   Sq Epi: x / Non Sq Epi: x / Bacteria: x        CARDIAC MARKERS ( 29 Mar 2024 01:42 )  x     / x     / 107 U/L / x     / x                                                LIVER FUNCTIONS - ( 29 Mar 2024 18:40 )  Alb: 4.2 g/dL / Pro: 6.3 g/dL / ALK PHOS: 79 U/L / ALT: 22 U/L / AST: 25 U/L / GGT: x                                                                                                                                       MEDICATIONS  (STANDING):  chlordiazePOXIDE 25 milliGRAM(s) Oral every 8 hours  chlorhexidine 2% Cloths 1 Application(s) Topical daily  chlorhexidine 4% Liquid 1 Application(s) Topical <User Schedule>  dextrose 5% + sodium chloride 0.45%. 1000 milliLiter(s) (125 mL/Hr) IV Continuous <Continuous>  enoxaparin Injectable 40 milliGRAM(s) SubCutaneous every 24 hours  folic acid 1 milliGRAM(s) Oral daily  multivitamin 1 Tablet(s) Oral daily  nicotine - 21 mG/24Hr(s) Patch 1 Patch Transdermal daily  thiamine 100 milliGRAM(s) Oral daily    MEDICATIONS  (PRN):  acetaminophen     Tablet .. 650 milliGRAM(s) Oral every 6 hours PRN Temp greater or equal to 38C (100.4F), Mild Pain (1 - 3)  aluminum hydroxide/magnesium hydroxide/simethicone Suspension 30 milliLiter(s) Oral every 4 hours PRN Dyspepsia  LORazepam     Tablet 2 milliGRAM(s) Oral every 2 hours PRN Symptom-triggered 2 point increase in CIWA-Ar  LORazepam   Injectable 2 milliGRAM(s) IV Push every 1 hour PRN Symptom-triggered: each CIWA -Ar score 8 or GREATER  melatonin 3 milliGRAM(s) Oral at bedtime PRN Insomnia  ondansetron Injectable 4 milliGRAM(s) IV Push every 8 hours PRN Nausea and/or Vomiting      New X-rays reviewed:       ECHO reviewed    CXR interpreted by me:    3/29  images and radiologist read reviewed, by my read demonstrating no intrathoracic pathology

## 2024-04-07 NOTE — CONSULT NOTE ADULT - SUBJECTIVE AND OBJECTIVE BOX
49-year-old Botswanan-speaking male brought in by hospital security and EMS for agitated behavior outside of the hospital.  Patient was aggressive in ED requiring 4-point restraints and chemical sedation.  Patient reportedly stated to EMS that he was drinking a lot of alcohol prior to coming into the ER. History was obtained using a B[place]an , as per patient he was a heavy alcohol drinker but quit a year ago but recently had binge drinking with his friends.  He states that he was diagnosed with an adrenal nodule 10 to 20 years ago.  He is not 100% sure why he had evaluation for the same.  He states he has a longstanding history of hypertension and takes Norvasc and another unknown medication.  Has a history of diabetes dyslipidemia bone fractures significant weight gain abdominal striae    # Adrenal nodule  -Seen on CT scan of the abdomen, only described as 1 .5 cm nodule with no comment on Hounsfield units, or washout  -Overall clinically does not have signs of excessive steroids, has a history of hypertension  -Denies history of diabetes easy bruising bone fractures wide violaceous striae significant weight gain, overall has no clinical apparent features of steroid excess, has noticed some palpitations, sweating recently  - physical exam did not reveal any wide violaceous striae, buffalo hump or moon facies  -Overall clinical suspicion of Cushing's syndrome, pheochromocytoma is very low and would not explain the acute manic episode that the patient experienced  -Difficult to evaluate for Cushing's inpatient as patient has been recently agitated,  can obtain a 24-hour urine cortisol   -Also recommend to obtain plasma metanephrines, serum aldosterone and plasma renin activity  but most likely would require evaluation of adrenal nodule outpatient in a non stressful setting

## 2024-04-08 ENCOUNTER — INPATIENT (INPATIENT)
Facility: HOSPITAL | Age: 49
LOS: 2 days | Discharge: HOME | DRG: 751 | End: 2024-04-11
Attending: PSYCHIATRY & NEUROLOGY | Admitting: PSYCHIATRY & NEUROLOGY
Payer: MEDICAID

## 2024-04-08 VITALS
WEIGHT: 182.54 LBS | DIASTOLIC BLOOD PRESSURE: 98 MMHG | SYSTOLIC BLOOD PRESSURE: 152 MMHG | HEIGHT: 71.65 IN | TEMPERATURE: 97 F | HEART RATE: 64 BPM

## 2024-04-08 VITALS
DIASTOLIC BLOOD PRESSURE: 86 MMHG | SYSTOLIC BLOOD PRESSURE: 160 MMHG | TEMPERATURE: 97 F | HEART RATE: 62 BPM | RESPIRATION RATE: 18 BRPM | OXYGEN SATURATION: 100 %

## 2024-04-08 DIAGNOSIS — F32.9 MAJOR DEPRESSIVE DISORDER, SINGLE EPISODE, UNSPECIFIED: ICD-10-CM

## 2024-04-08 DIAGNOSIS — F10.10 ALCOHOL ABUSE, UNCOMPLICATED: ICD-10-CM

## 2024-04-08 PROBLEM — Z86.59 PERSONAL HISTORY OF OTHER MENTAL AND BEHAVIORAL DISORDERS: Chronic | Status: ACTIVE | Noted: 2024-03-29

## 2024-04-08 PROBLEM — R45.851 SUICIDAL IDEATIONS: Chronic | Status: ACTIVE | Noted: 2024-03-29

## 2024-04-08 PROBLEM — Z86.018 PERSONAL HISTORY OF OTHER BENIGN NEOPLASM: Chronic | Status: ACTIVE | Noted: 2024-03-29

## 2024-04-08 PROBLEM — I10 ESSENTIAL (PRIMARY) HYPERTENSION: Chronic | Status: ACTIVE | Noted: 2024-03-29

## 2024-04-08 LAB
ALBUMIN SERPL ELPH-MCNC: 5 G/DL — SIGNIFICANT CHANGE UP (ref 3.5–5.2)
ALP SERPL-CCNC: 84 U/L — SIGNIFICANT CHANGE UP (ref 30–115)
ALT FLD-CCNC: 40 U/L — SIGNIFICANT CHANGE UP (ref 0–41)
ANION GAP SERPL CALC-SCNC: 12 MMOL/L — SIGNIFICANT CHANGE UP (ref 7–14)
AST SERPL-CCNC: 25 U/L — SIGNIFICANT CHANGE UP (ref 0–41)
BILIRUB SERPL-MCNC: 0.3 MG/DL — SIGNIFICANT CHANGE UP (ref 0.2–1.2)
BUN SERPL-MCNC: 14 MG/DL — SIGNIFICANT CHANGE UP (ref 10–20)
CALCIUM SERPL-MCNC: 9.7 MG/DL — SIGNIFICANT CHANGE UP (ref 8.4–10.5)
CHLORIDE SERPL-SCNC: 100 MMOL/L — SIGNIFICANT CHANGE UP (ref 98–110)
CO2 SERPL-SCNC: 27 MMOL/L — SIGNIFICANT CHANGE UP (ref 17–32)
CREAT SERPL-MCNC: 1 MG/DL — SIGNIFICANT CHANGE UP (ref 0.7–1.5)
EGFR: 92 ML/MIN/1.73M2 — SIGNIFICANT CHANGE UP
GLUCOSE SERPL-MCNC: 138 MG/DL — HIGH (ref 70–99)
HCT VFR BLD CALC: 43 % — SIGNIFICANT CHANGE UP (ref 42–52)
HGB BLD-MCNC: 15.8 G/DL — SIGNIFICANT CHANGE UP (ref 14–18)
MCHC RBC-ENTMCNC: 31.2 PG — HIGH (ref 27–31)
MCHC RBC-ENTMCNC: 36.7 G/DL — SIGNIFICANT CHANGE UP (ref 32–37)
MCV RBC AUTO: 84.8 FL — SIGNIFICANT CHANGE UP (ref 80–94)
NRBC # BLD: 0 /100 WBCS — SIGNIFICANT CHANGE UP (ref 0–0)
PLATELET # BLD AUTO: 216 K/UL — SIGNIFICANT CHANGE UP (ref 130–400)
PMV BLD: 12.2 FL — HIGH (ref 7.4–10.4)
POTASSIUM SERPL-MCNC: 4.1 MMOL/L — SIGNIFICANT CHANGE UP (ref 3.5–5)
POTASSIUM SERPL-SCNC: 4.1 MMOL/L — SIGNIFICANT CHANGE UP (ref 3.5–5)
PROT SERPL-MCNC: 7.2 G/DL — SIGNIFICANT CHANGE UP (ref 6–8)
RBC # BLD: 5.07 M/UL — SIGNIFICANT CHANGE UP (ref 4.7–6.1)
RBC # FLD: 11.3 % — LOW (ref 11.5–14.5)
SARS-COV-2 RNA SPEC QL NAA+PROBE: SIGNIFICANT CHANGE UP
SODIUM SERPL-SCNC: 139 MMOL/L — SIGNIFICANT CHANGE UP (ref 135–146)
WBC # BLD: 10.89 K/UL — HIGH (ref 4.8–10.8)
WBC # FLD AUTO: 10.89 K/UL — HIGH (ref 4.8–10.8)

## 2024-04-08 PROCEDURE — 83036 HEMOGLOBIN GLYCOSYLATED A1C: CPT

## 2024-04-08 PROCEDURE — 99239 HOSP IP/OBS DSCHRG MGMT >30: CPT

## 2024-04-08 PROCEDURE — 36415 COLL VENOUS BLD VENIPUNCTURE: CPT

## 2024-04-08 PROCEDURE — 80061 LIPID PANEL: CPT

## 2024-04-08 PROCEDURE — 84443 ASSAY THYROID STIM HORMONE: CPT

## 2024-04-08 PROCEDURE — 80053 COMPREHEN METABOLIC PANEL: CPT

## 2024-04-08 PROCEDURE — 99233 SBSQ HOSP IP/OBS HIGH 50: CPT | Mod: 95

## 2024-04-08 PROCEDURE — 85027 COMPLETE CBC AUTOMATED: CPT

## 2024-04-08 RX ORDER — NICOTINE POLACRILEX 2 MG
4 GUM BUCCAL
Refills: 0 | Status: DISCONTINUED | OUTPATIENT
Start: 2024-04-08 | End: 2024-04-11

## 2024-04-08 RX ORDER — CARVEDILOL PHOSPHATE 80 MG/1
1 CAPSULE, EXTENDED RELEASE ORAL
Qty: 0 | Refills: 0 | DISCHARGE
Start: 2024-04-08

## 2024-04-08 RX ORDER — QUETIAPINE FUMARATE 200 MG/1
1 TABLET, FILM COATED ORAL
Qty: 0 | Refills: 0 | DISCHARGE
Start: 2024-04-08

## 2024-04-08 RX ORDER — HYDROXYZINE HCL 10 MG
50 TABLET ORAL ONCE
Refills: 0 | Status: COMPLETED | OUTPATIENT
Start: 2024-04-08 | End: 2024-04-08

## 2024-04-08 RX ORDER — HALOPERIDOL DECANOATE 100 MG/ML
5 INJECTION INTRAMUSCULAR
Qty: 0 | Refills: 0 | DISCHARGE
Start: 2024-04-08

## 2024-04-08 RX ORDER — AMLODIPINE BESYLATE 2.5 MG/1
1 TABLET ORAL
Qty: 0 | Refills: 0 | DISCHARGE
Start: 2024-04-08

## 2024-04-08 RX ORDER — HALOPERIDOL DECANOATE 100 MG/ML
1 INJECTION INTRAMUSCULAR
Qty: 0 | Refills: 0 | DISCHARGE
Start: 2024-04-08

## 2024-04-08 RX ORDER — HALOPERIDOL DECANOATE 100 MG/ML
5 INJECTION INTRAMUSCULAR EVERY 6 HOURS
Refills: 0 | Status: DISCONTINUED | OUTPATIENT
Start: 2024-04-08 | End: 2024-04-11

## 2024-04-08 RX ADMIN — Medication 1 TABLET(S): at 12:47

## 2024-04-08 RX ADMIN — Medication 100 MILLIGRAM(S): at 12:47

## 2024-04-08 RX ADMIN — CHLORHEXIDINE GLUCONATE 1 APPLICATION(S): 213 SOLUTION TOPICAL at 12:46

## 2024-04-08 RX ADMIN — Medication 2 MILLIGRAM(S): at 22:24

## 2024-04-08 RX ADMIN — Medication 50 MILLIGRAM(S): at 21:05

## 2024-04-08 RX ADMIN — HALOPERIDOL DECANOATE 5 MILLIGRAM(S): 100 INJECTION INTRAMUSCULAR at 06:21

## 2024-04-08 RX ADMIN — Medication 4 MILLIGRAM(S): at 21:04

## 2024-04-08 RX ADMIN — Medication 1 MILLIGRAM(S): at 12:47

## 2024-04-08 RX ADMIN — AMLODIPINE BESYLATE 5 MILLIGRAM(S): 2.5 TABLET ORAL at 06:21

## 2024-04-08 RX ADMIN — Medication 1 PATCH: at 12:45

## 2024-04-08 NOTE — BH INPATIENT PSYCHIATRY ASSESSMENT NOTE - HPI (INCLUDE ILLNESS QUALITY, SEVERITY, DURATION, TIMING, CONTEXT, MODIFYING FACTORS, ASSOCIATED SIGNS AND SYMPTOMS)
Patient is a 49-year-old male, Faroese speaking, recently moved from [facility] 3 months ago, domiciled with wife and son on [address], past medical history of hypertension and ?pheochromocytoma (self-reported), no formal past psychiatric history per patient, no known prior inpatient psychiatric hospitalizations, no outpatient psychiatric care, denies history of prior substance admissions or rehab stays, who was BIB hospital security after being observed to be disorganized and agitated outside of the hospital. Patient was admitted to medicine for alcohol withdrawal. Psychiatry and Addiction Medicine consulted for suicidal ideation and alcohol abuse initially on 3/30/24, and the patient was cleared psychiatrically. Psychiatry was reconsulted on 4/4 for persisting disorientation and paranoia s/p alcohol detox. Patient was admitted to [facility] on 4/8/24 for further management.     On approach patient was sitting on his bed, and was amenable to being interviewed. Spoke to patient via Faroese  (ID [id number]). Patient was preoccupied with the team playing a song for him and kept answer each question with "just play the song". Patient states the song is his favorite, put give little other detail. The song is by a Surya Delvalle. Interview was limited by patient irritability and preoccupation with this song.    Patient is currently endorsing thoughts of suicide and is being vague about their mood. Patient is not disclosing any additional detail about current suicidality nor prior instances of NSSIB or SA, though he alluded to them. Patient is denying thoughts of wanting to harm other people or homicide. Patient is sleeping and eating okay. Patient is not endorsing anxiety at this time. Patient is not endorsing symptoms of adelaida or bipolar disorder at this time. Patient is not endorsing symptoms of PTSD at this time. Patient is not endorsing symptoms of psychosis at this time, specifically denying audio or visual hallucinations, and feelings of paranoia or distressing thoughts.     On Medical floors via  Psychiatry prior to transfer:  On assessment today, patient reports doing "good, I slept well." He explains "I feel discomfort inside, turning around, I can't explain it" which he notices when he wakes up.  I had stress because of I developed some kind of fear for death  I think its a withdrawal syndrome from alcohol and smoking  He admits to underlying stressors with relocating to the [place], that he is worried about his son, dragging him to a different country.     When interviewed privately, patient affirms ongoing feelings of fear and being in danger. when I fall asleep, it's all good but when I wake up I don't feel well" elaborating on an internal sensation of "shakiness" and discomfort. He affirms "a little bit" of a paranoid feeling that "wasn't as intense" a long time ago, "like 10 years ago" reiterating an experience 10 years ago that included anxiety, chest pain and syncope prior to a hospital stay where the adrenal gland tumor was diagnosed.   He conveys suspicions and agitation occurring this hospital stay are happening "for the first time," that previously it was more so anxiety with some vague suspiciousness. He denies any paranoia or anxiousness today, stating "no everything is fine" and estimates last experiencing these feelings "probably 2-3 days ago, especially when I watch TV." He affirms ideas of references at times when watching TV, affirms thought broadcasting and affirms thought insertion. He also vaguely conveys instances of AVH and says "I'm thinking because of medications and drinking," "for example, they show one thing on TV but I see different things."  Patient conveys improved sleep at this time and has slept well for "about 2 or 3 nights." He affirms currently having death wish thoughts and replies to questions about SI by stating "I think it's yes more than no" "I can't explain it"  When prompted as to whether he would ever take his own life, he responds with "I think I will stop thinking of that from now on."  SI - "probably for a week, perhaps more," denies any plan ever coming to mind, denies any prior suicide attempts, and instead tells me how he "had a fear of flying."  He can not recall every previously having symptoms of adelaida    it's been going on for a while now, I would drink for a while a lot, then I would stop, would go back     Patient is provided with education surrounding his hospital course and diagnostic concerns. He is educated on recommendations for further evaluation and management on the inpatient psychiatric unit. He conveys agreement stating "we need to do that then."     probably 7th or 8th of April, 24    Collateral is obtained from patient's wife Lilly at bedside. She reports that he feels much better than before, he's not getting his panic attacks and worries anymore. She notes that he does not even remember anything from being paranoid and agitated. She conveys her beliefs that this stems from stress, then he stopped sleeping, started drinking and everything got worse from there. She feels that moving to a new country, that there was actual danger back home before they moved and the combination of these things. She notes that he had not previously experienced these symptoms, has no history of paranoia, psychiatric illness or psychiatric medications.

## 2024-04-08 NOTE — BH CONSULTATION LIAISON PROGRESS NOTE - OTHER
mostly notably for vague content surrounding paranoia, ideas of reference and suicidality.  not observed mostly linear with intermittent tangents "good"

## 2024-04-08 NOTE — BH CONSULTATION LIAISON PROGRESS NOTE - NSBHCHARTREVIEWVS_PSY_A_CORE FT
Vital Signs Last 24 Hrs  T(C): 35.6 (04 Apr 2024 20:53), Max: 35.6 (04 Apr 2024 20:53)  T(F): 96 (04 Apr 2024 20:53), Max: 96 (04 Apr 2024 20:53)  HR: 65 (04 Apr 2024 22:10) (58 - 70)  BP: 177/96 (04 Apr 2024 22:10) (156/92 - 177/96)  BP(mean): --  RR: 16 (04 Apr 2024 22:10) (16 - 18)  SpO2: 98% (04 Apr 2024 20:53) (98% - 98%)    Parameters below as of 04 Apr 2024 20:53  Patient On (Oxygen Delivery Method): room air    
Vital Signs Last 24 Hrs  T(C): 35.8 (08 Apr 2024 05:14), Max: 36.2 (07 Apr 2024 13:09)  T(F): 96.5 (08 Apr 2024 05:14), Max: 97.2 (07 Apr 2024 13:09)  HR: 46 (08 Apr 2024 05:14) (46 - 78)  BP: 115/76 (08 Apr 2024 05:14) (115/76 - 150/86)  BP(mean): --  RR: 18 (08 Apr 2024 05:14) (16 - 18)  SpO2: 97% (08 Apr 2024 05:14) (97% - 97%)    Parameters below as of 07 Apr 2024 21:10  Patient On (Oxygen Delivery Method): room air    
Vital Signs Last 24 Hrs  T(C): 36.1 (04 Apr 2024 06:19), Max: 36.6 (03 Apr 2024 15:08)  T(F): 97 (04 Apr 2024 06:19), Max: 97.9 (03 Apr 2024 15:08)  HR: 65 (04 Apr 2024 06:19) (65 - 74)  BP: 155/84 (04 Apr 2024 06:19) (145/86 - 155/84)  BP(mean): 113 (03 Apr 2024 14:33) (113 - 113)  RR: 16 (04 Apr 2024 06:19) (16 - 20)  SpO2: 99% (03 Apr 2024 18:38) (99% - 100%)

## 2024-04-08 NOTE — BH PATIENT PROFILE - NSSBIRTAUDITSCORE_GEN_A_CORE_CAL
Was the patient seen in the last year in this department? Yes     Does patient have an active prescription for medications requested? No     Received Request Via: Pharmacy  TSH 0.690 0.300 - 3.700 uIU/mL       
31

## 2024-04-08 NOTE — BH CONSULTATION LIAISON PROGRESS NOTE - CURRENT MEDICATION
MEDICATIONS  (STANDING):  amLODIPine   Tablet 5 milliGRAM(s) Oral daily  carvedilol 12.5 milliGRAM(s) Oral every 12 hours  chlordiazePOXIDE 25 milliGRAM(s) Oral every 12 hours  chlorhexidine 2% Cloths 1 Application(s) Topical daily  enoxaparin Injectable 40 milliGRAM(s) SubCutaneous every 24 hours  folic acid 1 milliGRAM(s) Oral daily  multivitamin 1 Tablet(s) Oral daily  nicotine - 21 mG/24Hr(s) Patch 1 Patch Transdermal daily  thiamine 100 milliGRAM(s) Oral daily    MEDICATIONS  (PRN):  acetaminophen     Tablet .. 650 milliGRAM(s) Oral every 6 hours PRN Temp greater or equal to 38C (100.4F), Mild Pain (1 - 3)  aluminum hydroxide/magnesium hydroxide/simethicone Suspension 30 milliLiter(s) Oral every 4 hours PRN Dyspepsia  chlordiazePOXIDE 25 milliGRAM(s) Oral every 1 hour PRN Symptom-triggered: each CIWA -Ar score 8 or GREATER  melatonin 3 milliGRAM(s) Oral at bedtime PRN Insomnia  ondansetron Injectable 4 milliGRAM(s) IV Push every 8 hours PRN Nausea and/or Vomiting  sodium chloride 0.65% Nasal 1 Spray(s) Both Nostrils every 2 hours PRN Nasal Congestion  
MEDICATIONS  (STANDING):  amLODIPine   Tablet 5 milliGRAM(s) Oral daily  carvedilol 12.5 milliGRAM(s) Oral every 12 hours  chlorhexidine 2% Cloths 1 Application(s) Topical daily  enoxaparin Injectable 40 milliGRAM(s) SubCutaneous every 24 hours  folic acid 1 milliGRAM(s) Oral daily  haloperidol     Tablet 5 milliGRAM(s) Oral two times a day  multivitamin 1 Tablet(s) Oral daily  nicotine - 21 mG/24Hr(s) Patch 1 Patch Transdermal daily  thiamine 100 milliGRAM(s) Oral daily    MEDICATIONS  (PRN):  acetaminophen     Tablet .. 650 milliGRAM(s) Oral every 6 hours PRN Temp greater or equal to 38C (100.4F), Mild Pain (1 - 3)  aluminum hydroxide/magnesium hydroxide/simethicone Suspension 30 milliLiter(s) Oral every 4 hours PRN Dyspepsia  aluminum hydroxide/magnesium hydroxide/simethicone Suspension 30 milliLiter(s) Oral once PRN Dyspepsia  chlordiazePOXIDE 25 milliGRAM(s) Oral every 1 hour PRN Symptom-triggered: each CIWA -Ar score 8 or GREATER  haloperidol     Tablet 2.5 milliGRAM(s) Oral every 6 hours PRN severe anxiety or psychosis  melatonin 3 milliGRAM(s) Oral at bedtime PRN Insomnia  nicotine  Polacrilex Gum 2 milliGRAM(s) Oral every 2 hours PRN Smoking Cessation  ondansetron    Tablet 4 milliGRAM(s) Oral every 6 hours PRN Nausea and/or Vomiting  QUEtiapine 50 milliGRAM(s) Oral at bedtime PRN insomnia  sodium chloride 0.65% Nasal 1 Spray(s) Both Nostrils every 2 hours PRN Nasal Congestion  
MEDICATIONS  (STANDING):  amLODIPine   Tablet 5 milliGRAM(s) Oral daily  carvedilol 12.5 milliGRAM(s) Oral every 12 hours  chlorhexidine 2% Cloths 1 Application(s) Topical daily  enoxaparin Injectable 40 milliGRAM(s) SubCutaneous every 24 hours  folic acid 1 milliGRAM(s) Oral daily  multivitamin 1 Tablet(s) Oral daily  nicotine - 21 mG/24Hr(s) Patch 1 Patch Transdermal daily  QUEtiapine 50 milliGRAM(s) Oral at bedtime  thiamine 100 milliGRAM(s) Oral daily    MEDICATIONS  (PRN):  acetaminophen     Tablet .. 650 milliGRAM(s) Oral every 6 hours PRN Temp greater or equal to 38C (100.4F), Mild Pain (1 - 3)  aluminum hydroxide/magnesium hydroxide/simethicone Suspension 30 milliLiter(s) Oral every 4 hours PRN Dyspepsia  chlordiazePOXIDE 25 milliGRAM(s) Oral every 1 hour PRN Symptom-triggered: each CIWA -Ar score 8 or GREATER  melatonin 3 milliGRAM(s) Oral at bedtime PRN Insomnia  nicotine  Polacrilex Gum 2 milliGRAM(s) Oral every 2 hours PRN Smoking Cessation  ondansetron    Tablet 4 milliGRAM(s) Oral every 6 hours PRN Nausea and/or Vomiting  QUEtiapine 25 milliGRAM(s) Oral daily PRN agitation, psychosis, severe anxiety  sodium chloride 0.65% Nasal 1 Spray(s) Both Nostrils every 2 hours PRN Nasal Congestion

## 2024-04-08 NOTE — BH INPATIENT PSYCHIATRY ASSESSMENT NOTE - NSBHASSESSSUMMFT_PSY_ALL_CORE
Patient is a 49-year-old male, [place] speaking, recently moved from [facility] 3 months ago, domiciled with wife and son on [place], past medical history of hypertension and ?pheochromocytoma (self-reported), no formal past psychiatric history per patient, no known prior inpatient psychiatric hospitalizations, no outpatient psychiatric care, denies history of prior substance admissions or rehab stays, who was BIB hospital security after being observed to be disorganized and agitated outside of the hospital. Patient was admitted to medicine for alcohol withdrawal. Psychiatry and Addiction Medicine consulted for suicidal ideation and alcohol abuse initially on 3/30/24, and the patient was cleared psychiatrically. Psychiatry was reconsulted on 4/4 for persisting disorientation and paranoia s/p alcohol detox. Patient was admitted to [facility] on 4/8/24 for further management.     On initial assessment on [facility], patient presented as disorganized, tangential, and with delusional preoccupations occurring despite completion of alcohol withdrawal management. His psychiatric assessment at that time was limited by language difficulties (even with ), in addition to ruminative thought processes. Patient did however endorse vague suicidality From what can be gathered, he has cardiovascular symptoms with overlap in psychiatric symptomatology (insomnia, paranoia, mood fluctuations and physiologic anxiety) related to an adrenal gland tumor diagnosed years prior with patient poorly compliant with recommended outpatient monitoring after first refusing surgery. There is additionally an alcohol abuse pattern and (per neuro eval) mention of TBI and hx of being prone to seizures confounding his clinical picture and patient likely has an ongoing delirio genic process, though he could not attend to formal cognitive testing at this time. During his medical admission, patient has been in increasing behavior dysregulation, and it was reported that he attempted to jump over a nurses station required chemical and physical restraints. While on IPP, patient has been in good behavior control, but given history it is recommended to monitor him closely. At this time the patient would benefit from continued inpatient psychiatric admission for gathering of additional collateral for a baseline, possible medication management, and safety planning. Patient may also benefit from MAT for alcohol use d/o.    #Psychosis unspecified  - no standing medication at this time, need more collateral  - Laurens suicide scale when patient amenable  - mini mental status when patient amenable    #Alcohol use d/o  - consider MAT  - consider inpatient substance treatment    # Adrenal nodule (per endocrinology consult)  -Seen on CT scan of the abdomen, only described as 1 .5 cm nodule with no comment on Hounsfield units, or washout  -Overall clinically does not have signs of excessive steroids, has a history of hypertension  -Denies history of diabetes easy bruising bone fractures wide violaceous striae significant weight gain, overall has no clinical apparent features of steroid excess, has noticed some palpitations, sweating recently  - physical exam did not reveal any wide violaceous striae, buffalo hump or moon facies  -Overall clinical suspicion of Cushing's syndrome, pheochromocytoma is very low and would not explain the acute manic episode that the patient experienced  -Difficult to evaluate for Cushing's inpatient as patient has been recently agitated,  can obtain a 24-hour urine cortisol   -Also recommend to obtain plasma metanephrine, serum aldosterone and plasma renin activity  but most likely would require evaluation of adrenal nodule outpatient in a non stressful setting    #HTN  - norvasc 5mg PO daily    #Medical comorbidities  - f/u CBC, CMP, lipids, TSH, a1c    #agitation  - For severe agitation not responding to behavioral intervention, consider offering haldol 5 mg po q6h prn, ativan 2 mg PO q6h prn, with escalation to IM if patient refusing PO and remains an imminent danger to self or others. If IM antipsychotic is administered, please perform follow-up ECG for QTc monitoring. Hold antipsychotics if Qtc>500 ms.       (0) independent

## 2024-04-08 NOTE — BH CONSULTATION LIAISON PROGRESS NOTE - NSBHASSESSMENTFT_PSY_ALL_CORE
Patient initially seen by psychiatry on 3/30 with relevant assessment at that time as such: Javier Kee is a 49-year-old male, Zimbabwean speaking, domiciled with his son in [place], past medical history of hypertension (self-reported), no formal past psychiatric history, no known history of suicide attempts or inpatient psychiatric hospitalizations, no formal past substance use history, denies history of detox admissions or rehab stays, admitted to medicine for alcohol withdrawal. Psychiatry and Addiction Medicine consulted for suicidal ideation and alcohol abuse. On psychiatric evaluation, patient is not acutely depressed, suicidal, psychotic, or manic. Patient's presentation upon arriving to the hospital - autonomic instability, agitation, hallucinations - were likely secondary to alcohol withdrawal. Patient is not acutely suicidal with intent or plan. Psychiatrically, there are no contraindications for discharge. Patient's presentation does not warrant inpatient psychiatric admission. Patient would benefit from continuing the current librium taper while monitoring for signs and symptoms of protracted withdrawal. Patient appears to still have waxing and waning mental status (e.g., disruptions in orientation) intermittently throughout the day that may worsen at night. Recommend to continue 1:1 Constant Observation for this reason.     Psychiatry re-consulted at this time for disorientation with paranoid preoccupation occuring despite completion of alcohol withdrawal management. His psychiatric assessment at this time is limited by tangential, ruminative thought process, with patient often conveying history in tangents that at times unrelated to the questions asked. From what can be gathered, he has cardiovascular symptoms with overlap in psychiatric symptomatology (insomnia, paranoia, mood fluctuations and physiologic anxiety) related to an adrenal gland tumor diagnosed years prior with patient poorly compliant with recommended outpatient monitoring after first refusing surgery. There is additionally an alcohol abuse pattern confounding his clinical picture and patient likely has an ongoing deliriogenic process, though he could not attend to formal cognitive testing at this time. Recommend symptomatic management of organic psychosis, anxiety and insomnia in addition to delirium provisions and further workup/management of underlying endocrinological issues as note below. 
Patient initially seen by psychiatry on 3/30 with relevant assessment at that time as such: Javier Kee is a 49-year-old male, Filipino speaking, domiciled with his son in [place], past medical history of hypertension (self-reported), no formal past psychiatric history, no known history of suicide attempts or inpatient psychiatric hospitalizations, no formal past substance use history, denies history of detox admissions or rehab stays, admitted to medicine for alcohol withdrawal. Psychiatry and Addiction Medicine consulted for suicidal ideation and alcohol abuse. On psychiatric evaluation, patient is not acutely depressed, suicidal, psychotic, or manic. Patient's presentation upon arriving to the hospital - autonomic instability, agitation, hallucinations - were likely secondary to alcohol withdrawal. Patient is not acutely suicidal with intent or plan. Psychiatrically, there are no contraindications for discharge. Patient's presentation does not warrant inpatient psychiatric admission. Patient would benefit from continuing the current librium taper while monitoring for signs and symptoms of protracted withdrawal. Patient appears to still have waxing and waning mental status (e.g., disruptions in orientation) intermittently throughout the day that may worsen at night. Recommend to continue 1:1 Constant Observation for this reason.     Psychiatry re-consulted on 4/4 for disorientation with paranoid preoccupation occurring despite completion of alcohol withdrawal management. His psychiatric assessment at that time was limited by tangential, ruminative thought process, with patient often conveying history in tangents that at times were unrelated to the questions asked. From what was gathered on that assessment, he had cardiovascular symptoms with overlap in psychiatric symptomatology (insomnia, paranoia, mood fluctuations and physiologic anxiety) related to an adrenal gland tumor diagnosed years prior with patient poorly compliant with recommended outpatient monitoring after first refusing surgery. There was additionally an alcohol abuse pattern and (per neuro eval) mention of TBI and hx of being prone to seizures confounding his clinical picture and patient likely had an ongoing deliriogenic process, though he could not attend to formal cognitive testing at that time. He then became more profoundly dysregulated subsequently, agitated on 4/4 overnight into 4/5, requiring chemical and physical restraints intermittently. Recommended symptomatic management of organic psychosis, anxiety and insomnia (Haldol 5 mg BID PO started) in addition to delirium provisions and further workup/management of underlying neuro-endocrinological issues as note below. Patient has since began sleeping and today is appropriate in speech, thought and behavior. MRI and cortisol levels eventually obtained were within normal levels (obtained after patient was no longer acutely dysregulated). He does still convey symptoms suggestive of ongoing psychosis, affirms having experienced some first rank psychosis symptoms recently (ideas of reference, thought broadcasting/insertion) and is vague in his responses surrounding suicidality (conveying some degree of current SI without clear plan or intent elicited). There also remains a reasonable uncertainty about his diagnosis and treatment needs. As such, inpatient psychiatric admission for further evaluation and stabilization is required. 
Patient initially seen by psychiatry on 3/30 with relevant assessment at that time as such: Javier Kee is a 49-year-old male, Algerian speaking, domiciled with his son in [place], past medical history of hypertension (self-reported), no formal past psychiatric history, no known history of suicide attempts or inpatient psychiatric hospitalizations, no formal past substance use history, denies history of detox admissions or rehab stays, admitted to medicine for alcohol withdrawal. Psychiatry and Addiction Medicine consulted for suicidal ideation and alcohol abuse. On psychiatric evaluation, patient is not acutely depressed, suicidal, psychotic, or manic. Patient's presentation upon arriving to the hospital - autonomic instability, agitation, hallucinations - were likely secondary to alcohol withdrawal. Patient is not acutely suicidal with intent or plan. Psychiatrically, there are no contraindications for discharge. Patient's presentation does not warrant inpatient psychiatric admission. Patient would benefit from continuing the current librium taper while monitoring for signs and symptoms of protracted withdrawal. Patient appears to still have waxing and waning mental status (e.g., disruptions in orientation) intermittently throughout the day that may worsen at night. Recommend to continue 1:1 Constant Observation for this reason.     Psychiatry re-consulted on 4/4 for disorientation with paranoid preoccupation occurring despite completion of alcohol withdrawal management. His psychiatric assessment at that time was limited by tangential, ruminative thought process, with patient often conveying history in tangents that at times were unrelated to the questions asked. From what can be gathered, he has cardiovascular symptoms with overlap in psychiatric symptomatology (insomnia, paranoia, mood fluctuations and physiologic anxiety) related to an adrenal gland tumor diagnosed years prior with patient poorly compliant with recommended outpatient monitoring after first refusing surgery. There is additionally an alcohol abuse pattern and (per neuro eval) mention of TBI and hx of being prone to seizures confounding his clinical picture and patient likely has an ongoing deliriogenic process, though he could not attend to formal cognitive testing at this time. He has been more profoundly dysregulated subsequently, agitated overnight requiring chemical and physical restraints. Recommended symptomatic management of organic psychosis, anxiety and insomnia in addition to delirium provisions and further workup/management of underlying neuro-endocrinological issues as note below. Patient should not be allowed to leave AMA at his current mental state as he is unable to meaninfully engage in treatment discussion and would represent an elevated risk of harm to self and others in his current mental state. Psychiatry will follow along for medication management and to clarify his psychiatric safety risks/disposition needs when medical factors have been addressed.

## 2024-04-08 NOTE — BH CONSULTATION LIAISON PROGRESS NOTE - NSBHATTESTBILLING_PSY_A_CORE
42046-Pbuwnhsurj OBS or IP - high complexity OR 50-79 mins
[id number]-Abxienslqd OBS or IP - high complexity OR 50-79 mins
[id number]-Tjuaffgicb OBS or IP - high complexity OR 50-79 mins

## 2024-04-08 NOTE — BH INPATIENT PSYCHIATRY ASSESSMENT NOTE - NSSUICRSKFACTOR_PSY_ALL_CORE
Current and Past Psychiatric Diagnoses/Presenting Symptoms/Treatment Related Factors X Size Of Lesion In Cm (Optional): 2.2

## 2024-04-08 NOTE — BH CONSULTATION LIAISON PROGRESS NOTE - NSBHCHARTREVIEWINVESTIGATE_PSY_A_CORE FT
< from: 12 Lead ECG (04.02.24 @ 21:22) >  Ventricular Rate 56 BPM  Atrial Rate 56 BPM  P-R Interval 220 ms  QRS Duration 88 ms  Q-T Interval 424 ms  QTC Calculation(Bazett) 409 ms  P Axis 36 degrees  R Axis 50 degrees  T Axis 48 degrees  Diagnosis Line Sinus bradycardia with 1st degree A-V block  Otherwise normal ECG  < end of copied text >    
< from: 12 Lead ECG (04.02.24 @ 21:22) >  Ventricular Rate 56 BPM  Atrial Rate 56 BPM  P-R Interval 220 ms  QRS Duration 88 ms  Q-T Interval 424 ms  QTC Calculation(Bazett) 409 ms  P Axis 36 degrees  R Axis 50 degrees  T Axis 48 degrees  Diagnosis Line Sinus bradycardia with 1st degree A-V block  Otherwise normal ECG  < end of copied text >

## 2024-04-08 NOTE — DISCHARGE NOTE PROVIDER - NSDCCPCAREPLAN_GEN_ALL_CORE_FT
PRINCIPAL DISCHARGE DIAGNOSIS  Diagnosis: Alcohol abuse  Assessment and Plan of Treatment: Admitted with alcohol abuse and withdrawal. Resolved with benzo medications      SECONDARY DISCHARGE DIAGNOSES  Diagnosis: Manic state  Assessment and Plan of Treatment: Seen by psychiatry and deemed appropriate for transfer to [facility]

## 2024-04-08 NOTE — BH CONSULTATION LIAISON PROGRESS NOTE - NSICDXBHSECONDARYDX_PSY_ALL_CORE
Anxiety disorder due to medical condition   F06.4  Moderate alcohol use disorder   F10.20  

## 2024-04-08 NOTE — BH INPATIENT PSYCHIATRY ASSESSMENT NOTE - OTHER PAST PSYCHIATRIC HISTORY (INCLUDE DETAILS REGARDING ONSET, COURSE OF ILLNESS, INPATIENT/OUTPATIENT TREATMENT)
no formal past psychiatric history per patient, no known prior inpatient psychiatric hospitalizations, no outpatient psychiatric care, denies history of prior substance admissions or rehab stays, who was BIB hospital security after being observed to be disorganized and agitated outside of the hospital

## 2024-04-08 NOTE — DISCHARGE NOTE PROVIDER - HOSPITAL COURSE
49-year-old Liberian-speaking male brought in by hospital security and EMS for agitated behavior outside of the hospital. Patient was aggressive in ED requiring 4-point restraints and chemical sedation.  Patient reportedly stated to EMS that he was drinking a lot of alcohol prior to coming into the ER.  Reportedly 1 pint of alcohol every day and stopped 2 days prior to admission. Was initially admitted to ICU with alcohol withdrawal / alcoholic ketoacidosis - resolved with treatment of benzos. Downgraded to medical floor. While on medical floor noted to be disorganized, agitated, paranoid, fixated on calling 911. Had to be restrained and given IM medications for agitation. Psych consulted and followed. Unclear if underlying mood or psychotic disorder. STarted on haldol and seroquel. Medical workup notable for negative CTH, MRI Brain, lab work. Does have history of adrenal tumor which is noted on CT A/P, though further wokrup limited inpatient per endocrinology. Further more no evidence of adrenal crisis throughout admission. Was started on Coreg and AMlodipine fo hypertension Cleared for discharge to [facility]. suicidal ideation / suspected thiamine and folate deficiency / concern for acute psychosis/adelaida/bipolar disorder      49-year-old American-speaking male brought in by hospital security and EMS for agitated behavior outside of the hospital. Patient was aggressive in ED requiring 4-point restraints and chemical sedation.  Patient reportedly stated to EMS that he was drinking a lot of alcohol prior to coming into the ER.  Reportedly 1 pint of alcohol every day and stopped 2 days prior to admission. Was initially admitted to ICU with alcohol withdrawal / alcoholic ketoacidosis - resolved with treatment of benzos. Downgraded to medical floor. While on medical floor noted to be disorganized, agitated, paranoid, fixated on calling 911. Had to be restrained and given IM medications for agitation. Psych consulted and followed. Unclear if underlying mood or psychotic disorder. Started on haldol with improvement in symptoms. Medical workup notable for negative CTH, MRI Brain, lab work. Does have history of adrenal tumor which is noted on CT A/P, cortisol level was normal though further workup limited inpatient per Endocrinology. No evidence of adrenal crisis throughout admission. Was started on Coreg and Amlodipine for hypertension. Medically cleared for discharge to [facility]. suicidal ideation / suspected thiamine and folate deficiency / concern for acute psychosis/adelaida/bipolar disorder.    Pending Labs per Endocrinology:  Aldosterone, serum  Metanephrine, plasma  Renin activity, plasma  If any of above labs abnormal can contact Endocrinology for further recs.

## 2024-04-08 NOTE — BH INPATIENT PSYCHIATRY ASSESSMENT NOTE - NSBHMETABOLIC_PSY_ALL_CORE_FT
BMI: BMI (kg/m2): 24.9 (04-08-24 @ 15:22)  HbA1c:   Glucose: POCT Blood Glucose.: 117 mg/dL (03-31-24 @ 16:14)    BP: 152/98 (04-08-24 @ 15:22) (152/98 - 152/98)Vital Signs Last 24 Hrs  T(C): 36 (04-08-24 @ 15:22), Max: 36.1 (04-08-24 @ 13:53)  T(F): 96.8 (04-08-24 @ 15:22), Max: 96.9 (04-08-24 @ 13:53)  HR: 64 (04-08-24 @ 15:22) (46 - 64)  BP: 152/98 (04-08-24 @ 15:22) (115/76 - 160/86)  BP(mean): --  RR: 18 (04-08-24 @ 13:53) (18 - 18)  SpO2: 100% (04-08-24 @ 13:53) (97% - 100%)      Lipid Panel:

## 2024-04-08 NOTE — BH INPATIENT PSYCHIATRY ASSESSMENT NOTE - NSBHCHARTREVIEWVS_PSY_A_CORE FT
Vital Signs Last 24 Hrs  T(C): 36 (04-08-24 @ 15:22), Max: 36.1 (04-08-24 @ 13:53)  T(F): 96.8 (04-08-24 @ 15:22), Max: 96.9 (04-08-24 @ 13:53)  HR: 64 (04-08-24 @ 15:22) (46 - 64)  BP: 152/98 (04-08-24 @ 15:22) (115/76 - 160/86)  BP(mean): --  RR: 18 (04-08-24 @ 13:53) (18 - 18)  SpO2: 100% (04-08-24 @ 13:53) (97% - 100%)

## 2024-04-08 NOTE — BH PATIENT PROFILE - NSICDXPASTMEDICALHX_GEN_ALL_CORE_FT
PAST MEDICAL HISTORY:  Alcohol abuse     H/O: depression     History of adenoma of adrenal gland     HTN (hypertension)     Suicidal ideations

## 2024-04-08 NOTE — BH INPATIENT PSYCHIATRY ASSESSMENT NOTE - VIOLENCE RISK FACTORS:
show Substance abuse/Impulsivity/Lack of insight into violence risk/need for treatment/Noncompliance with treatment

## 2024-04-08 NOTE — BH CONSULTATION LIAISON PROGRESS NOTE - NSBHFUPINTERVALHXFT_PSY_A_CORE
Assessment conducted with [facility]  Tila ID#[id number]    On assessment today, patient reports doing "good, I slept well." He explains "I feel discomfort inside, turning around, I can't explain it" which he notices when he wakes up.  I had stress because of I developed some kind of fear for death  I think its a withdrawal syndrome from alcohol and smoking  He admits to underlying stressors with relocating to the [place], that he is worried about his son, dragging him to a different country.     When interviewed privately, patient affirms ongoing feelings of fear and being in danger. when I fall asleep, it's all good but when I wake up I don't feel well" elaborating on an internal sensation of "shakiness" and discomfort. He affirms "a little bit" of a paranoid feeling that "wasn't as intense" a long time ago, "like 10 years ago" reiterating an experience 10 years ago that included anxiety, chest pain and syncope prior to a hospital stay where the adrenal gland tumor was diagnosed.   He conveys suspicions and agitation occurring this hospital stay are happening "for the first time," that previously it was more so anxiety with some vague suspiciousness. He denies any paranoia or anxiousness today, stating "no everything is fine" and estimates last experiencing these feelings "probably 2-3 days ago, especially when I watch TV." He affirms ideas of references at times when watching TV, affirms thought broadcasting and affirms thought insertion. He also vaguely conveys instances of AVH and says "I'm thinking because of medications and drinking," "for example, they show one thing on TV but I see different things."  Patient conveys improved sleep at this time and has slept well for "about 2 or 3 nights." He affirms currently having death wish thoughts and replies to questions about SI by stating "I think it's yes more than no" "I can't explain it"  When prompted as to whether he would ever take his own life, he responds with "I think I will stop thinking of that from now on."  SI - "probably for a week, perhaps more," denies any plan ever coming to mind, denies any prior suicide attempts, and instead tells me how he "had a fear of flying."  He can not recall every previously having symptoms of adelaida    it's been going on for a while now, I would drink for a while a lot, then I would stop, would go back     Patient is provided with education surrounding his hospital course and diagnostic concerns. He is educated on recommendations for further evaluation and management on the inpatient psychiatric unit. He conveys agreement stating "we need to do that then."       probably 7th or 8th of April, 24      Collateral is obtained from patient's wife Lilly at bedside. She reports that he feels much better than before, he's not getting his panic attacks and worries anymore. She notes that he does not even remember anything from being paranoid and agitated. She conveys her beliefs that this stems from stress, then he stopped sleeping, started drinking and everything got worse from there. She feels that moving to a new country, that there was actual danger back home before they moved and the combination of these things. She notes that he had not previously experienced these symptoms, has no history of paranoia, psychiatric illness or psychiatric medications.  Assessment conducted with [facility] [place]  Tila ID#[id number]    On assessment today, patient is seen initially with wife, Lilly at bedside. He reports doing "good, I slept well." He explains "I feel discomfort inside, turning around, I can't explain it" which he notices when he wakes up. He also relays "I had stress because of I developed some kind of fear for death" regarding his agitation and psychosis this admission, adding that "I think its a withdrawal syndrome from alcohol and smoking." He admits to underlying stressors with [place] the [place], that he is worried about his son, "dragging him to a different country."    His wife reports that he feels much better than before, he's not getting his panic attacks and worries anymore. She notes that he does not even remember anything from being paranoid and agitated. She conveys her beliefs that this stems from stress, then he stopped sleeping, started drinking and everything got worse from there. She feels that moving to a new country, that there was actual danger back home before they moved and the combination of these things is to blame for his hospital presentation. She notes that he had not previously experienced these symptoms, has no history of paranoia, psychiatric illness or psychiatric medications.     When interviewed privately, patient affirms ongoing feelings of fear and being in danger. He reports "when I fall asleep, it's all good but when I wake up I don't feel well" elaborating on an internal sensation of "shakiness" and discomfort. He affirms "a little bit" of a paranoid feeling that "wasn't as intense" a long time ago, "like 10 years ago" reiterating an experience 10 years ago that included anxiety, chest pain and syncope prior to a hospital stay where the adrenal gland tumor was diagnosed. He conveys suspicions and agitation occurring this hospital stay are happening "for the first time," that previously it was more so anxiety with some vague suspiciousness. He denies any paranoia or anxiousness today, stating "no everything is fine" and estimates last experiencing these feelings "probably 2-3 days ago, especially when I watch TV." He affirms ideas of references at times when watching TV, affirms thought broadcasting and affirms thought insertion. He also vaguely conveys instances of AVH and says "I'm thinking because of medications and drinking," "for example, they show one thing on TV but I see different things." He conveys improved sleep at this time and has slept well for "about 2 or 3 nights." He affirms currently having death wish thoughts and replies to questions about SI by stating "I think it's yes more than no," "I can't explain it." When prompted as to whether he would ever take his own life, he responds with "I think I will stop thinking of that from now on." He estimates onset of SI as "probably for a week, perhaps more," denies any plan ever coming to mind, denies any prior suicide attempts, and instead tells me how he "had a fear of flying." He can not recall ever previously having symptoms of adelaida. He also tells me "it's been going on for a while now, I would drink for a while a lot, then I would stop, would go back to drinking a lot, then stop." Patient is provided with education surrounding his hospital course and diagnostic concerns. He is educated on recommendations for further evaluation and management on the inpatient psychiatric unit. He conveys agreement stating "we need to do that then."

## 2024-04-08 NOTE — BH INPATIENT PSYCHIATRY ASSESSMENT NOTE - NSBHCHARTREVIEWINVESTIGATE_PSY_A_CORE FT
< from: 12 Lead ECG (03.29.24 @ 04:48) >    Ventricular Rate 55 BPM    Atrial Rate 55 BPM    P-R Interval 212 ms    QRS Duration 88 ms    Q-T Interval 452 ms    QTC Calculation(Bazett) 432 ms    P Axis 35 degrees    R Axis 56 degrees    T Axis 58 degrees    Diagnosis Line Sinus bradycardia with 1st degree A-V block  Otherwise normal ECG    < end of copied text >

## 2024-04-08 NOTE — DISCHARGE NOTE PROVIDER - NSDCMRMEDTOKEN_GEN_ALL_CORE_FT
amLODIPine 5 mg oral tablet: 1 tab(s) orally once a day  carvedilol 12.5 mg oral tablet: 1 tab(s) orally every 12 hours  haloperidol 0.5 mg oral tablet: 5 tab(s) orally every 6 hours As needed severe anxiety or psychosis  haloperidol 5 mg oral tablet: 1 tab(s) orally 2 times a day  QUEtiapine 50 mg oral tablet: 1 tab(s) orally once a day (at bedtime) As needed insomnia

## 2024-04-08 NOTE — DISCHARGE NOTE NURSING/CASE MANAGEMENT/SOCIAL WORK - PATIENT PORTAL LINK FT
You can access the FollowMyHealth Patient Portal offered by [facility] by registering at the following website: [email or web address]. By joining [employer]’s FollowMyHealth portal, you will also be able to view your health information using other applications (apps) compatible with our system.

## 2024-04-08 NOTE — BH PATIENT PROFILE - NSELOPEMENTRISKFACTORS_PSY_ALL_CORE
Fearful and demonstrating paranoia related to hospital environment/Current substance withdrawal with verbaliziation of strong urge to use

## 2024-04-08 NOTE — BH PATIENT PROFILE - NSSBIRTALCACTION/INTER_GEN_A_CORE
Brief intervention/Passive referral to treatment Positive reinforcement/Brief intervention/Passive referral to treatment

## 2024-04-08 NOTE — BH INPATIENT PSYCHIATRY ASSESSMENT NOTE - NSBHLEGALSTATUSDT_PSY_ALL_CORE
Labor Epidural      Patient location during procedure: OB  Indication:at surgeon's request  Performed By  Anesthesiologist: Viet Ram MD  Preanesthetic Checklist  Completed: patient identified, site marked, surgical consent, pre-op evaluation, timeout performed, IV checked, risks and benefits discussed and monitors and equipment checked  Prep:  Pt Position:sitting  Sterile Tech:cap, gloves, mask and sterile barrier  Prep:chlorhexidine gluconate and isopropyl alcohol  Monitoring:blood pressure monitoring and EKG  Epidural Block Procedure:  Approach:midline  Guidance:landmark technique  Location:L2-L3  Needle Type:Tuohy  Needle Gauge:17  Loss of Resistance Medium: air  Cath Depth at skin:12 cm  Paresthesia: none  Aspiration:negative  Test Dose:negative  Number of Attempts: 3  Post Assessment:  Dressing:occlusive dressing applied, secured with tape and biopatch applied  Pt Tolerance:patient tolerated the procedure well with no apparent complications  Complications:no            
08-Apr-2024 17:05

## 2024-04-08 NOTE — BH INPATIENT PSYCHIATRY ASSESSMENT NOTE - DESCRIPTION
[place] speaking, born and raised in [place], moved to [place] in Dec 2023

## 2024-04-08 NOTE — BH CONSULTATION LIAISON PROGRESS NOTE - PRN MEDS
Ativan 2 mg IM & Haldol 5 mg given Friday afternoon for agitation w/ code crispin called at that time. Haldol 2.5 mg PO PRN given x2 on Saturday, then no PRNs since. Patient did begin accepting oral standing Haldol doses on Friday night. 
Ativan 2 mg IM given x2 at 21:21 and 22:59 last night for agitation. Physical restraints also required at that time.

## 2024-04-08 NOTE — BH CONSULTATION LIAISON PROGRESS NOTE - NSBHINDICATION_PSY_ALL_CORE
Altered mental state with agitation, paranoia, threatening behaviors and poor engagement with safety screening. 
Psychosis and SI; pending IPP transfer
Observation should be determined by primary team based on severity of pt's confusion and related safety concerns, such as agitation or fall risk. There is currently no psychiatric indication for constant/one-to-one observation and patient's observation status can be upgraded or downgraded according to patient's supervision needs as primary team sees fit.

## 2024-04-08 NOTE — BH PATIENT PROFILE - HOME MEDICATIONS
amLODIPine 5 mg oral tablet , 1 tab(s) orally once a day  carvedilol 12.5 mg oral tablet , 1 tab(s) orally every 12 hours  QUEtiapine 50 mg oral tablet , 1 tab(s) orally once a day (at bedtime) As needed insomnia  haloperidol 0.5 mg oral tablet , 5 tab(s) orally every 6 hours As needed severe anxiety or psychosis  haloperidol 5 mg oral tablet , 1 tab(s) orally 2 times a day

## 2024-04-09 DIAGNOSIS — F41.9 ANXIETY DISORDER, UNSPECIFIED: ICD-10-CM

## 2024-04-09 DIAGNOSIS — F29 UNSPECIFIED PSYCHOSIS NOT DUE TO A SUBSTANCE OR KNOWN PHYSIOLOGICAL CONDITION: ICD-10-CM

## 2024-04-09 LAB
A1C WITH ESTIMATED AVERAGE GLUCOSE RESULT: 6.3 % — HIGH (ref 4–5.6)
ALDOST SERPL-MCNC: 19.9 NG/DL — SIGNIFICANT CHANGE UP
CHOLEST SERPL-MCNC: 176 MG/DL — SIGNIFICANT CHANGE UP
ESTIMATED AVERAGE GLUCOSE: 134 MG/DL — HIGH (ref 68–114)
HDLC SERPL-MCNC: 29 MG/DL — LOW
LIPID PNL WITH DIRECT LDL SERPL: 98 MG/DL — SIGNIFICANT CHANGE UP
NON HDL CHOLESTEROL: 147 MG/DL — HIGH
TRIGL SERPL-MCNC: 244 MG/DL — HIGH
TSH SERPL-MCNC: 1.08 UIU/ML — SIGNIFICANT CHANGE UP (ref 0.27–4.2)

## 2024-04-09 PROCEDURE — 99252 IP/OBS CONSLTJ NEW/EST SF 35: CPT

## 2024-04-09 RX ORDER — ACETAMINOPHEN 500 MG
650 TABLET ORAL ONCE
Refills: 0 | Status: COMPLETED | OUTPATIENT
Start: 2024-04-09 | End: 2024-04-09

## 2024-04-09 RX ORDER — AMLODIPINE BESYLATE 2.5 MG/1
5 TABLET ORAL DAILY
Refills: 0 | Status: DISCONTINUED | OUTPATIENT
Start: 2024-04-09 | End: 2024-04-11

## 2024-04-09 RX ORDER — CARVEDILOL PHOSPHATE 80 MG/1
12.5 CAPSULE, EXTENDED RELEASE ORAL EVERY 12 HOURS
Refills: 0 | Status: DISCONTINUED | OUTPATIENT
Start: 2024-04-09 | End: 2024-04-11

## 2024-04-09 RX ADMIN — Medication 2 MILLIGRAM(S): at 15:19

## 2024-04-09 RX ADMIN — AMLODIPINE BESYLATE 5 MILLIGRAM(S): 2.5 TABLET ORAL at 18:52

## 2024-04-09 RX ADMIN — Medication 650 MILLIGRAM(S): at 18:58

## 2024-04-09 RX ADMIN — Medication 650 MILLIGRAM(S): at 19:30

## 2024-04-09 RX ADMIN — Medication 4 MILLIGRAM(S): at 15:19

## 2024-04-09 RX ADMIN — CARVEDILOL PHOSPHATE 12.5 MILLIGRAM(S): 80 CAPSULE, EXTENDED RELEASE ORAL at 17:19

## 2024-04-09 NOTE — BH SAFETY PLAN - ENVIRONMENT SAFETY 1:
I feel my environment would be safer if I could change my career to a less stressful one. It would greatly benefit me if I sought out help to quit drinking and got a sponsor and attended Alchohol Anonymous meetings for support to quit drinking.

## 2024-04-09 NOTE — BH INPATIENT PSYCHIATRY PROGRESS NOTE - NSBHMETABOLIC_PSY_ALL_CORE_FT
BMI: BMI (kg/m2): 25 (04-08-24 @ 15:22)  HbA1c: A1C with Estimated Average Glucose Result: 6.3 % (04-08-24 @ 19:40)    Glucose: POCT Blood Glucose.: 117 mg/dL (03-31-24 @ 16:14)    BP: 132/86 (04-09-24 @ 10:28) (132/86 - 152/98)Vital Signs Last 24 Hrs  T(C): 36 (04-08-24 @ 15:22), Max: 36.1 (04-08-24 @ 13:53)  T(F): 96.8 (04-08-24 @ 15:22), Max: 96.9 (04-08-24 @ 13:53)  HR: 78 (04-09-24 @ 10:28) (62 - 78)  BP: 132/86 (04-09-24 @ 10:28) (132/86 - 160/86)  BP(mean): --  RR: 18 (04-09-24 @ 10:28) (18 - 18)  SpO2: 100% (04-08-24 @ 13:53) (100% - 100%)      Lipid Panel: Date/Time: 04-09-24 @ 07:10  Cholesterol, Serum: 176  LDL Cholesterol Calculated: 98  HDL Cholesterol, Serum: 29  Total Cholesterol/HDL Ration Measurement: --  Triglycerides, Serum: 244   BMI: BMI (kg/m2): 25 (04-08-24 @ 15:22)  HbA1c: A1C with Estimated Average Glucose Result: 6.3 % (04-08-24 @ 19:40)    Glucose: POCT Blood Glucose.: 117 mg/dL (03-31-24 @ 16:14)    BP: 132/86 (04-09-24 @ 10:28) (132/86 - 152/98)Vital Signs Last 24 Hrs  T(C): 36 (04-08-24 @ 15:22), Max: 36 (04-08-24 @ 15:22)  T(F): 96.8 (04-08-24 @ 15:22), Max: 96.8 (04-08-24 @ 15:22)  HR: 78 (04-09-24 @ 10:28) (64 - 78)  BP: 132/86 (04-09-24 @ 10:28) (132/86 - 152/98)  BP(mean): --  RR: 18 (04-09-24 @ 10:28) (18 - 18)  SpO2: --      Lipid Panel: Date/Time: 04-09-24 @ 07:10  Cholesterol, Serum: 176  LDL Cholesterol Calculated: 98  HDL Cholesterol, Serum: 29  Total Cholesterol/HDL Ration Measurement: --  Triglycerides, Serum: 244   BMI: BMI (kg/m2): 25 (04-08-24 @ 15:22)  HbA1c: A1C with Estimated Average Glucose Result: 6.3 % (04-08-24 @ 19:40)    Glucose: POCT Blood Glucose.: 117 mg/dL (03-31-24 @ 16:14)    BP: 132/86 (04-09-24 @ 10:28) (132/86 - 152/98)Vital Signs Last 24 Hrs  T(C): --  T(F): --  HR: 78 (04-09-24 @ 10:28) (78 - 78)  BP: 132/86 (04-09-24 @ 10:28) (132/86 - 132/86)  BP(mean): --  RR: 18 (04-09-24 @ 10:28) (18 - 18)  SpO2: --      Lipid Panel: Date/Time: 04-09-24 @ 07:10  Cholesterol, Serum: 176  LDL Cholesterol Calculated: 98  HDL Cholesterol, Serum: 29  Total Cholesterol/HDL Ration Measurement: --  Triglycerides, Serum: 244   BMI: BMI (kg/m2): 25 (04-08-24 @ 15:22)  HbA1c: A1C with Estimated Average Glucose Result: 6.3 % (04-08-24 @ 19:40)    Glucose: POCT Blood Glucose.: 117 mg/dL (03-31-24 @ 16:14)    BP: 160/109 (04-09-24 @ 15:42) (132/86 - 160/109)Vital Signs Last 24 Hrs  T(C): 35.6 (04-09-24 @ 15:42), Max: 35.6 (04-09-24 @ 15:42)  T(F): 96 (04-09-24 @ 15:42), Max: 96 (04-09-24 @ 15:42)  HR: 73 (04-09-24 @ 15:42) (73 - 78)  BP: 160/109 (04-09-24 @ 15:42) (132/86 - 160/109)  BP(mean): --  RR: 18 (04-09-24 @ 15:42) (18 - 18)  SpO2: --      Lipid Panel: Date/Time: 04-09-24 @ 07:10  Cholesterol, Serum: 176  LDL Cholesterol Calculated: 98  HDL Cholesterol, Serum: 29  Total Cholesterol/HDL Ration Measurement: --  Triglycerides, Serum: 244

## 2024-04-09 NOTE — BH INPATIENT PSYCHIATRY PROGRESS NOTE - NSBHASSESSSUMMFT_PSY_ALL_CORE
Patient is a 49-year-old male, Cymraes speaking, recently moved from [facility] 3 months ago, domiciled with wife and son on [place], past medical history of hypertension and ?pheochromocytoma (self-reported), no formal past psychiatric history per patient, no known prior inpatient psychiatric hospitalizations, no outpatient psychiatric care, denies history of prior substance admissions or rehab stays, who was BIB hospital security after being observed to be disorganized and agitated outside of the hospital. Patient was admitted to medicine for alcohol withdrawal. Psychiatry and Addiction Medicine consulted for suicidal ideation and alcohol abuse initially on 3/30/24, and the patient was cleared psychiatrically. Psychiatry was reconsulted on 4/4 for persisting disorientation and paranoia s/p alcohol detox. Patient was admitted to [facility] on 4/8/24 for further management.     On initial assessment on [facility], patient presented as disorganized, tangential, and with delusional preoccupations occurring despite completion of alcohol withdrawal management. His psychiatric assessment at that time was limited by language difficulties (even with ), in addition to ruminative thought processes. Patient did however endorse vague suicidality From what can be gathered, he has cardiovascular symptoms with overlap in psychiatric symptomatology (insomnia, paranoia, mood fluctuations and physiologic anxiety) related to an adrenal gland tumor diagnosed years prior with patient poorly compliant with recommended outpatient monitoring after first refusing surgery. There is additionally an alcohol abuse pattern and (per neuro eval) mention of TBI and hx of being prone to seizures confounding his clinical picture and patient likely has an ongoing delirio genic process, though he could not attend to formal cognitive testing at this time. During his medical admission, patient has been in increasing behavior dysregulation, and it was reported that he attempted to jump over a nurses station required chemical and physical restraints. While on IPP, patient has been in good behavior control, but given history it is recommended to monitor him closely. At this time the patient would benefit from continued inpatient psychiatric admission for gathering of additional collateral for a baseline, possible medication management, and safety planning. Patient may also benefit from MAT for alcohol use d/o.    #Psychosis unspecified  - no standing medication at this time, need more collateral  - [place] suicide scale when patient amenable  - mini mental status when patient amenable    #Alcohol use d/o  - consider MAT  - consider inpatient substance treatment    # Adrenal nodule (per endocrinology consult)  -Seen on CT scan of the abdomen, only described as 1 .5 cm nodule with no comment on Hounsfield units, or washout  -Overall clinically does not have signs of excessive steroids, has a history of hypertension  -Denies history of diabetes easy bruising bone fractures wide violaceous striae significant weight gain, overall has no clinical apparent features of steroid excess, has noticed some palpitations, sweating recently  - physical exam did not reveal any wide violaceous striae, buffalo hump or moon facies  -Overall clinical suspicion of Cushing's syndrome, pheochromocytoma is very low and would not explain the acute manic episode that the patient experienced  -Difficult to evaluate for Cushing's inpatient as patient has been recently agitated,  can obtain a 24-hour urine cortisol   -Also recommend to obtain plasma metanephrine, serum aldosterone and plasma renin activity  but most likely would require evaluation of adrenal nodule outpatient in a non stressful setting    #HTN  - norvasc 5mg PO daily    #Medical comorbidities  - f/u CBC, CMP, lipids, TSH, a1c    #agitation  - For severe agitation not responding to behavioral intervention, consider offering haldol 5 mg po q6h prn, ativan 2 mg PO q6h prn, with escalation to IM if patient refusing PO and remains an imminent danger to self or others. If IM antipsychotic is administered, please perform follow-up ECG for QTc monitoring. Hold antipsychotics if Qtc>500 ms.   Patient is a 49-year-old male, Jamaican speaking, recently moved from [facility] 3 months ago, domiciled with wife and son on [place], past medical history of hypertension and ?pheochromocytoma (self-reported), no formal past psychiatric history per patient, no known prior inpatient psychiatric hospitalizations, no outpatient psychiatric care, denies history of prior substance admissions or rehab stays, who was BIB hospital security after being observed to be disorganized and agitated outside of the hospital. Patient was admitted to medicine for alcohol withdrawal. Psychiatry and Addiction Medicine consulted for suicidal ideation and alcohol abuse initially on 3/30/24, and the patient was cleared psychiatrically. Psychiatry was reconsulted on 4/4 for persisting disorientation and paranoia s/p alcohol detox. Patient was admitted to [facility] on 4/8/24 for further management.     On initial assessment on [facility], patient presented as disorganized, tangential, and with delusional preoccupations occurring despite completion of alcohol withdrawal management. His psychiatric assessment at that time was limited by language difficulties (even with ), in addition to ruminative thought processes. Patient did however endorse vague suicidality From what can be gathered, he has cardiovascular symptoms with overlap in psychiatric symptomatology (insomnia, paranoia, mood fluctuations and physiologic anxiety) related to an adrenal gland tumor diagnosed years prior with patient poorly compliant with recommended outpatient monitoring after first refusing surgery. There is additionally an alcohol abuse pattern and (per neuro eval) mention of TBI and hx of being prone to seizures confounding his clinical picture and patient likely has an ongoing delirio genic process, though he could not attend to formal cognitive testing at this time. During his medical admission, patient has been in increasing behavior dysregulation, and it was reported that he attempted to jump over a nurses station required chemical and physical restraints. While on IPP, patient has been in good behavior control, but given history it is recommended to monitor him closely. At this time the patient would benefit from continued inpatient psychiatric admission for gathering of additional collateral for a baseline, possible medication management, and safety planning. Patient may also benefit from MAT for alcohol use d/o. Patient should be asked more about alcohol use.    #Psychosis unspecified  - no standing medication at this time, need more collateral  - [place] suicide scale when patient amenable  - mini mental status when patient amenable  - may be acute stress reaction to recent alcohol use and stress from family/career    #Alcohol use d/o  - consider MAT  - consider inpatient substance treatment  - ask Javier more about alcohol use    # Adrenal nodule (per endocrinology consult)  -Seen on CT scan of the abdomen, only described as 1 .5 cm nodule with no comment on Hounsfield units, or washout  -Overall clinically does not have signs of excessive steroids, has a history of hypertension  -Denies history of diabetes easy bruising bone fractures wide violaceous striae significant weight gain, overall has no clinical apparent features of steroid excess, has noticed some palpitations, sweating recently  - physical exam did not reveal any wide violaceous striae, buffalo hump or moon facies  -Overall clinical suspicion of Cushing's syndrome, pheochromocytoma is very low and would not explain the acute manic episode that the patient experienced  -Difficult to evaluate for Cushing's inpatient as patient has been recently agitated,  can obtain a 24-hour urine cortisol   -Also recommend to obtain plasma metanephrine, serum aldosterone and plasma renin activity  but most likely would require evaluation of adrenal nodule outpatient in a non stressful setting    #HTN  - norvasc 5mg PO daily    #Medical comorbidities  - f/u CBC, CMP, lipids, TSH, a1c    #agitation  - For severe agitation not responding to behavioral intervention, consider offering haldol 5 mg po q6h prn, ativan 2 mg PO q6h prn, with escalation to IM if patient refusing PO and remains an imminent danger to self or others. If IM antipsychotic is administered, please perform follow-up ECG for QTc monitoring. Hold antipsychotics if Qtc>500 ms.       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 Patient was admitted to medicine for alcohol withdrawal. Psychiatry and Addiction Medicine consulted for suicidal ideation and alcohol abuse initially on 3/30/24, and the patient was cleared psychiatrically. Psychiatry was reconsulted on 4/4 for persisting disorientation and paranoia s/p alcohol detox. Patient was admitted to [facility] on 4/8/24 for further management.     On initial assessment on [facility], patient presented as disorganized, tangential, and with delusional preoccupations occurring despite completion of alcohol withdrawal management. His psychiatric assessment at that time was limited by language difficulties (even with ), in addition to ruminative thought processes. Patient did however endorse vague suicidality From what can be gathered, he has cardiovascular symptoms with overlap in psychiatric symptomatology (insomnia, paranoia, mood fluctuations and physiologic anxiety) related to an adrenal gland tumor diagnosed years prior with patient poorly compliant with recommended outpatient monitoring after first refusing surgery. There is additionally an alcohol abuse pattern and (per neuro eval) mention of TBI and hx of being prone to seizures confounding his clinical picture and patient likely has an ongoing delirio genic process, though he could not attend to formal cognitive testing at this time. During his medical admission, patient has been in increasing behavior dysregulation, and it was reported that he attempted to jump over a nurses station required chemical and physical restraints. While on IPP, patient has been in good behavior control, but given history it is recommended to monitor him closely. At this time the patient would benefit from continued inpatient psychiatric admission for gathering of additional collateral for a baseline, possible medication management, and safety planning. Patient may also benefit from MAT for alcohol use d/o. Patient should be asked more about alcohol use.    #Psychosis unspecified  - no standing medication at this time, need more collateral  - [place] suicide scale when patient amenable  - mini mental status when patient amenable  - may be acute stress reaction to recent alcohol use and stress from family/career  - pt may benefit from SSRI - discuss w/ pt &     #Alcohol use d/o  - consider MAT  - consider inpatient substance treatment  - ask Javier more about alcohol use    # Adrenal nodule (per endocrinology consult)  -Seen on CT scan of the abdomen, only described as 1 .5 cm nodule with no comment on Hounsfield units, or washout  -Overall clinically does not have signs of excessive steroids, has a history of hypertension  -Denies history of diabetes easy bruising bone fractures wide violaceous striae significant weight gain, overall has no clinical apparent features of steroid excess, has noticed some palpitations, sweating recently  - physical exam did not reveal any wide violaceous striae, buffalo hump or moon facies  -Overall clinical suspicion of Cushing's syndrome, pheochromocytoma is very low and would not explain the acute manic episode that the patient experienced  -Difficult to evaluate for Cushing's inpatient as patient has been recently agitated,  can obtain a 24-hour urine cortisol   -Also recommend to obtain plasma metanephrine, serum aldosterone and plasma renin activity  but most likely would require evaluation of adrenal nodule outpatient in a non stressful setting    #HTN  - norvasc 5mg PO daily    #Medical comorbidities  - f/u CBC, CMP, lipids, TSH, a1c    #agitation  - For severe agitation not responding to behavioral intervention, consider offering haldol 5 mg po q6h prn, ativan 2 mg PO q6h prn, with escalation to IM if patient refusing PO and remains an imminent danger to self or others. If IM antipsychotic is administered, please perform follow-up ECG for QTc monitoring. Hold antipsychotics if Qtc>500 ms.       Patient is a 49-year-old male, [place] speaking, recently moved from [facility] 3 months ago, domiciled with wife and son on [place], past medical history of hypertension and ?pheochromocytoma (self-reported), no formal past psychiatric history per patient, no known prior inpatient psychiatric hospitalizations, no outpatient psychiatric care, denies history of prior substance admissions or rehab stays, who was BIB hospital security after being observed to be disorganized and agitated outside of the hospital. Patient was admitted to medicine for alcohol withdrawal. Psychiatry and Addiction Medicine consulted for suicidal ideation and alcohol abuse initially on 3/30/24, and the patient was cleared psychiatrically. Psychiatry was reconsulted on 4/4 for persisting disorientation and paranoia s/p alcohol detox. Patient was admitted to [facility] on 4/8/24 for further management.     On initial assessment on [facility], patient presented as disorganized, tangential, and with delusional preoccupations occurring despite completion of alcohol withdrawal management. His psychiatric assessment at that time was limited by language difficulties (even with ), in addition to ruminative thought processes. Patient did however endorse vague suicidality From what can be gathered, he has cardiovascular symptoms with overlap in psychiatric symptomatology (insomnia, paranoia, mood fluctuations and physiologic anxiety) related to an adrenal gland tumor diagnosed years prior with patient poorly compliant with recommended outpatient monitoring after first refusing surgery. There is additionally an alcohol abuse pattern and (per neuro eval) mention of TBI and hx of being prone to seizures confounding his clinical picture and patient likely has an ongoing delirio genic process, though he could not attend to formal cognitive testing at this time. During his medical admission, patient has been in increasing behavior dysregulation, and it was reported that he attempted to jump over a nurses station required chemical and physical restraints. While on IPP, patient has been in good behavior control, but given history it is recommended to monitor him closely. At this time the patient would benefit from continued inpatient psychiatric admission for gathering of additional collateral for a baseline, possible medication management, and safety planning. Patient may also benefit from MAT for alcohol use d/o.    #Psychosis unspecified  - no standing medication at this time, need more collateral  - [place] suicide scale when patient amenable  - mini mental status when patient amenable  - may be acute stress reaction to recent alcohol use and stress from family/career  - pt may benefit from SSRI - discuss w/ pt &     #Alcohol use d/o  - consider MAT  - consider inpatient substance treatment  - ask Javier more about alcohol use    # Adrenal nodule (per endocrinology consult)  -Seen on CT scan of the abdomen, only described as 1 .5 cm nodule with no comment on Hounsfield units, or washout  -Overall clinically does not have signs of excessive steroids, has a history of hypertension  -Denies history of diabetes easy bruising bone fractures wide violaceous striae significant weight gain, overall has no clinical apparent features of steroid excess, has noticed some palpitations, sweating recently  - physical exam did not reveal any wide violaceous striae, buffalo hump or moon facies  -Overall clinical suspicion of Cushing's syndrome, pheochromocytoma is very low and would not explain the acute manic episode that the patient experienced  -Difficult to evaluate for Cushing's inpatient as patient has been recently agitated,  can obtain a 24-hour urine cortisol   -Also recommend to obtain plasma metanephrine, serum aldosterone and plasma renin activity  but most likely would require evaluation of adrenal nodule outpatient in a non stressful setting    #HTN  - norvasc 5mg PO daily    #Medical comorbidities  - f/u CBC, CMP, lipids, TSH, a1c    #agitation  - For severe agitation not responding to behavioral intervention, consider offering haldol 5 mg po q6h prn, ativan 2 mg PO q6h prn, with escalation to IM if patient refusing PO and remains an imminent danger to self or others. If IM antipsychotic is administered, please perform follow-up ECG for QTc monitoring. Hold antipsychotics if Qtc>500 ms.

## 2024-04-09 NOTE — BH SAFETY PLAN - WARNING SIGN 1
The situation is that I came to [place] to see my son play on a travel hockey team from [place] and the accommodations for my son and our family were not as promised, so our stay was disappointing and stressful. 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 Being the sole provider and the stress from home and this trip, led me to drink and I ended up at the hospital but my memory was blank about the situation.

## 2024-04-09 NOTE — BH INPATIENT PSYCHIATRY PROGRESS NOTE - NSBHFUPINTERVALHXFT_PSY_A_CORE
Nurse reports no overnight or interval events. Patient took all prescribed medications. Patient required no PRN medications since the last assessment.    Pt is [place] citizen-speaking, and we communicated with using translation services using  ID # [id number]. On approach patient was located in his room, resting comfortably, sitting on his bed , alone in the room. Patient was alert to self, location, date, and situation. When asked how you are doing today, patient responded "I am good". He recollected his trip to this hospital, saying he was in the car, not doing well, and then he arrived at the hospital. Patient expressed that today he did some floor exercise workouts in his room which made him feel well. He said he wants to go home to be with his wife and son, and was agreeable to discharge. The team explained to him that he will be visiting his family at 1pm today, and the team will be meeting with his wife at 1:30pm. He was agreeable to this plan.     Patient affirms that he feels safe at the hospital. He is denying feeling depressed. He is denying SI/AVH at this time. Patient is reporting no new symptoms. Patient is reporting no new side effects from medications. Patient is reporting that they are sleeping and eating okay. Patient is not reporting any additional questions, and expresses he wants to see his wife and son.  Pt gave us permission to speak to Wife. Wife's phone number: [phone]    Nurse reports no overnight or interval events. Patient took all prescribed medications. Patient required no PRN medications since the last assessment.    Pt is Burkinan-speaking, and we communicated with using translation services using  ID # [id number]. On approach patient was located in his room, resting comfortably, sitting on his bed , alone in the room. Patient was alert to self, location, date, and situation. When asked how you are doing today, patient responded "I am good". He recollected his trip to this hospital, saying he was in the car, not doing well, and then he arrived at the hospital. Patient expressed that today he did some floor exercise workouts in his room which made him feel well. He said he wants to go home to be with his wife and son, and was agreeable to discharge. The team explained to him that he will be visiting his family at 1pm today, and the team will be meeting with his wife at 1:30pm. He was agreeable to this plan.     Patient affirms that he feels safe at the hospital. He is denying feeling depressed. He is denying SI/AVH at this time. Patient is reporting no new symptoms. Patient is reporting no new side effects from medications. Patient is reporting that they are sleeping and eating okay. Patient is not reporting any additional questions, and expresses he wants to see his wife and son.  Pt gave us permission to speak to Wife. Wife's phone number: [phone]    Nurse reports no overnight or interval events. Patient took all prescribed medications. Patient required no PRN medications since the last assessment.    Pt is Burkinan-speaking, and we communicated with using translation services using  ID # [id number]. On approach patient was located in his room, resting comfortably, sitting on his bed , alone in the room. Patient was alert to self, location, date, and situation. When asked how you are doing today, patient responded "I am good". He recollected his trip to this hospital, saying he was in the car, not doing well, and then he arrived at the hospital. Patient expressed that today he did some floor exercise workouts in his room which made him feel well. He said he wants to go home to be with his wife and son, and was agreeable to discharge. The team explained to him that he will be visiting his family at 1pm today, and the team will be meeting with his wife at 1:30pm. He was agreeable to this plan, but we were not able to meet with the wife today 4/9. Nurse Nathaniel spoke to wife today and said that wife is not aware of any substance abuse (alcohol) issues. Reports Javier is living in the [place] for only a few months for his son's hockey season and then will be returning to [place]. He does not have a therapist in [place], according to wife. We called wife at  [phone] today at 3pm using [employer] and she did not respond, we left a voicemail asking her to call us back, we plan to ask Javier to tell his wife to call us.     Patient affirms that he feels safe at the hospital. He is denying feeling depressed. He is denying SI/AVH at this time. Patient is reporting no new symptoms. Patient is reporting no new side effects from medications. Patient is reporting that they are sleeping and eating okay. Patient is not reporting any additional questions, and expresses he wants to see his wife and son.  Pt gave us permission to speak to Wife. Wife's phone number: [phone]    Nurse reports no overnight or interval events. Patient took all prescribed medications. Since the last assessment, patient took PRN meds: Ativan at 10pm on 4/8/2024 and Nicotine patch at 9pm on 4/8/2024.     Pt is Finnish-speaking, and we communicated with using translation services using  ID # [id number]. On approach patient was located in his room, resting comfortably, sitting on his bed , alone in the room. Patient was alert to self, location, date, and situation. When asked how you are doing today, patient responded "I am good". He recollected his trip to this hospital, saying he was in the car, not doing well, and then he arrived at the hospital. Patient expressed that today he did some floor exercise workouts in his room which made him feel well. He said he wants to go home to be with his wife and son, and was agreeable to discharge. The team explained to him that he will be visiting his family at 1pm today, and the team will be meeting with his wife at 1:30pm. He was agreeable to this plan, but we were not able to meet with the wife today 4/9. Nurse Nathaniel spoke to wife today and said that wife is not aware of any substance abuse (alcohol) issues. Reports Javier is living in the [place] for only a few months for his son's hockey season and then will be returning to [place]. He does not have a therapist in [place], according to wife. We called wife at  [phone] today at 3pm using [employer] and she did not respond, we left a voicemail asking her to call us back. His wife called back to say: he stayed up for 4 nights straight, he was not sleeping, was saying things that sounded paranoid like people want to hurt him. She reports he has depression that has become worse but no suicidality. It is difficult from wife's assessment if he has had depressive episodes in the past, and this is the first time something like this has happened.     Patient affirms that he feels safe at the hospital. He is denying feeling depressed. He is denying SI/AVH at this time. Patient is reporting no new symptoms. Patient is reporting no new side effects from medications. Patient is reporting that they are sleeping and eating okay. Patient is not reporting any additional questions, and expresses he wants to see his wife and son.  Pt gave us permission to speak to Wife. Wife's phone number: [phone]    Nurse reports no overnight or interval events. Patient took all prescribed medications. Since the last assessment, patient took PRN meds: Ativan at 10pm on 4/8/2024 and Nicotine patch at 9pm on 4/8/2024.     Pt is Guamanian-speaking, and we communicated with using translation services using  ID # [id number]. On approach patient was located in his room, resting comfortably, sitting on his bed , alone in the room. Patient was alert to self, location, date, and situation. When asked how you are doing today, patient responded "I am good". He recollected his trip to this hospital, saying he was in the car, not doing well, and then he arrived at the hospital. Patient expressed that today he did some floor exercise workouts in his room which made him feel well. He said he wants to go home to be with his wife and son, and was agreeable to discharge. The team explained to him that he will be visiting his family at 1pm today, and the team will be meeting with his wife at 1:30pm. He was agreeable to this plan.    Team met with wife Lilly today. Nurse Nathaniel spoke to wife today and said that wife is not aware of any substance abuse (alcohol) issues. We called wife at  [phone] today at 3pm using [employer]. Reports Javier is living in the [place] for only a few months for his son's hockey season and then will be returning to [place]. He does not have a therapist in [place], according to wife. Lilly (wife) said Javier stayed up for 4 nights straight, he was not sleeping, was saying things that sounded paranoid like people want to hurt him. She reports he has depression that has become worse but no suicidality. It is difficult from wife's assessment if he has had depressive episodes in the past, and this is the first time something like this has happened.     Patient affirms that he feels safe at the hospital. He is denying feeling depressed. He is denying SI/AVH at this time. Patient is reporting no new symptoms. Patient is reporting no new side effects from medications. Patient is reporting that they are sleeping and eating okay. Patient is not reporting any additional questions, and expresses he wants to see his wife and son.

## 2024-04-09 NOTE — BH INPATIENT PSYCHIATRY PROGRESS NOTE - NSBHCHARTREVIEWVS_PSY_A_CORE FT
Vital Signs Last 24 Hrs  T(C): 36 (04-08-24 @ 15:22), Max: 36.1 (04-08-24 @ 13:53)  T(F): 96.8 (04-08-24 @ 15:22), Max: 96.9 (04-08-24 @ 13:53)  HR: 78 (04-09-24 @ 10:28) (62 - 78)  BP: 132/86 (04-09-24 @ 10:28) (132/86 - 160/86)  BP(mean): --  RR: 18 (04-09-24 @ 10:28) (18 - 18)  SpO2: 100% (04-08-24 @ 13:53) (100% - 100%)     Vital Signs Last 24 Hrs  T(C): 36 (04-08-24 @ 15:22), Max: 36 (04-08-24 @ 15:22)  T(F): 96.8 (04-08-24 @ 15:22), Max: 96.8 (04-08-24 @ 15:22)  HR: 78 (04-09-24 @ 10:28) (64 - 78)  BP: 132/86 (04-09-24 @ 10:28) (132/86 - 152/98)  BP(mean): --  RR: 18 (04-09-24 @ 10:28) (18 - 18)  SpO2: --     Vital Signs Last 24 Hrs  T(C): --  T(F): --  HR: 78 (04-09-24 @ 10:28) (78 - 78)  BP: 132/86 (04-09-24 @ 10:28) (132/86 - 132/86)  BP(mean): --  RR: 18 (04-09-24 @ 10:28) (18 - 18)  SpO2: --     Vital Signs Last 24 Hrs  T(C): 35.6 (04-09-24 @ 15:42), Max: 35.6 (04-09-24 @ 15:42)  T(F): 96 (04-09-24 @ 15:42), Max: 96 (04-09-24 @ 15:42)  HR: 73 (04-09-24 @ 15:42) (73 - 78)  BP: 160/109 (04-09-24 @ 15:42) (132/86 - 160/109)  BP(mean): --  RR: 18 (04-09-24 @ 15:42) (18 - 18)  SpO2: --

## 2024-04-09 NOTE — BH INPATIENT PSYCHIATRY PROGRESS NOTE - NSBHATTESTBILLING_PSY_A_CORE
99222-Initial OBS or IP - moderate complexity OR 55-74 mins 11459-Ibflkmeecw OBS or IP - high complexity OR 50-79 mins

## 2024-04-09 NOTE — PROGRESS NOTE ADULT - SUBJECTIVE AND OBJECTIVE BOX
HOSPITALIST CONSULT for [facility] History and Physical     FLETCHER PERRY  49y, Male  Allergy: Allergy Status Unknown      CHIEF COMPLAINT:     HPI:    HPI:  Patient is a 49-year-old male, Afghan speaking, recently moved from [facility] 3 months ago, domiciled with wife and son on [address], past medical history of hypertension and ?pheochromocytoma (self-reported), no formal past psychiatric history per patient, no known prior inpatient psychiatric hospitalizations, no outpatient psychiatric care, denies history of prior substance admissions or rehab stays, who was BIB hospital security after being observed to be disorganized and agitated outside of the hospital. Patient was admitted to medicine for alcohol withdrawal. Psychiatry and Addiction Medicine consulted for suicidal ideation and alcohol abuse initially on 3/30/24, and the patient was cleared psychiatrically. Psychiatry was reconsulted on 4/4 for persisting disorientation and paranoia s/p alcohol detox. Patient was admitted to [facility] on 4/8/24 for further management.     On approach patient was sitting on his bed, and was amenable to being interviewed. Spoke to patient via Afghan  (ID [id number]). Patient was preoccupied with the team playing a song for him and kept answer each question with "just play the song". Patient states the song is his favorite, put give little other detail. The song is by a Surya Delvalle. Interview was limited by patient irritability and preoccupation with this song.    Patient is currently endorsing thoughts of suicide and is being vague about their mood. Patient is not disclosing any additional detail about current suicidality nor prior instances of NSSIB or SA, though he alluded to them. Patient is denying thoughts of wanting to harm other people or homicide. Patient is sleeping and eating okay. Patient is not endorsing anxiety at this time. Patient is not endorsing symptoms of adelaida or bipolar disorder at this time. Patient is not endorsing symptoms of PTSD at this time. Patient is not endorsing symptoms of psychosis at this time, specifically denying audio or visual hallucinations, and feelings of paranoia or distressing thoughts.     On Medical floors via  Psychiatry prior to transfer:  On assessment today, patient reports doing "good, I slept well." He explains "I feel discomfort inside, turning around, I can't explain it" which he notices when he wakes up.  I had stress because of I developed some kind of fear for death  I think its a withdrawal syndrome from alcohol and smoking  He admits to underlying stressors with [place]ocating to the [place], that he is worried about his son, dragging him to a different country.     When interviewed privately, patient affirms ongoing feelings of fear and being in danger. when I fall asleep, it's all good but when I wake up I don't feel well" elaborating on an internal sensation of "shakiness" and discomfort. He affirms "a little bit" of a paranoid feeling that "wasn't as intense" a long time ago, "like 10 years ago" reiterating an experience 10 years ago that included anxiety, chest pain and syncope prior to a hospital stay where the adrenal gland tumor was diagnosed.   He conveys suspicions and agitation occurring this hospital stay are happening "for the first time," that previously it was more so anxiety with some vague suspiciousness. He denies any paranoia or anxiousness today, stating "no everything is fine" and estimates last experiencing these feelings "probably 2-3 days ago, especially when I watch TV." He affirms ideas of references at times when watching TV, affirms thought broadcasting and affirms thought insertion. He also vaguely conveys instances of AVH and says "I'm thinking because of medications and drinking," "for example, they show one thing on TV but I see different things."  Patient conveys improved sleep at this time and has slept well for "about 2 or 3 nights." He affirms currently having death wish thoughts and replies to questions about SI by stating "I think it's yes more than no" "I can't explain it"  When prompted as to whether he would ever take his own life, he responds with "I think I will stop thinking of that from now on."  SI - "probably for a week, perhaps more," denies any plan ever coming to mind, denies any prior suicide attempts, and instead tells me how he "had a fear of flying."  He can not recall every previously having symptoms of adelaida    it's been going on for a while now, I would drink for a while a lot, then I would stop, would go back     Patient is provided with education surrounding his hospital course and diagnostic concerns. He is educated on recommendations for further evaluation and management on the inpatient psychiatric unit. He conveys agreement stating "we need to do that then."       probably 7th or 8th of April, 24    Collateral is obtained from patient's wife Lilly at bedside. She reports that he feels much better than before, he's not getting his panic attacks and worries anymore. She notes that he does not even remember anything from being paranoid and agitated. She conveys her beliefs that this stems from stress, then he stopped sleeping, started drinking and everything got worse from there. She feels that moving to a new country, that there was actual danger back home before they moved and the combination of these things. She notes that he had not previously experienced these symptoms, has no history of paranoia, psychiatric illness or psychiatric medications. (08 Apr 2024 16:02)      MEDICAL HPI:  Pt seen and examined in IPP. He denies CP, SOB, abd pain, n/v. He said he wants to go home.     INTERVAL HISTORY:  Pt seen and examined.    FAMILY HISTORY:  No pertinent family history in first degree relatives      PAST MEDICAL & SURGICAL HISTORY:  Alcohol abuse      H/O: depression      Suicidal ideations      HTN (hypertension)      History of adenoma of adrenal gland      No significant past surgical history          SOCIAL HISTORY  Social History:  Reports Recently moved from [place] with his son  Living with a friend (29 Mar 2024 15:53)      Home Medications:  amLODIPine 5 mg oral tablet: 1 tab(s) orally once a day (08 Apr 2024 12:08)  carvedilol 12.5 mg oral tablet: 1 tab(s) orally every 12 hours (08 Apr 2024 12:08)  haloperidol 0.5 mg oral tablet: 5 tab(s) orally every 6 hours As needed severe anxiety or psychosis (08 Apr 2024 12:08)  haloperidol 5 mg oral tablet: 1 tab(s) orally 2 times a day (08 Apr 2024 12:08)  QUEtiapine 50 mg oral tablet: 1 tab(s) orally once a day (at bedtime) As needed insomnia (08 Apr 2024 12:08)      ROS  General: Denies fevers, chills, night sweats, weight loss  HEENT: Denies headache, rhinorrhea, sore throat, eye pain  CV: Denies CP, palpitations  PULM: Denies SOB, cough  GI: Denies abdominal pain, diarrhea  : Denies dysuria, hematuria  MSK: Denies arthralgias  SKIN: Denies rash   NEURO: Denies paresthesias, weakness  PSYCH: Endorses anxiety      VITALS:  T(F): 96, Max: 96 (04-09-24 @ 15:42)  HR: 73  BP: 160/109  RR: 18Vital Signs Last 24 Hrs  T(C): 35.6 (09 Apr 2024 15:42), Max: 35.6 (09 Apr 2024 15:42)  T(F): 96 (09 Apr 2024 15:42), Max: 96 (09 Apr 2024 15:42)  HR: 73 (09 Apr 2024 15:42) (73 - 78)  BP: 160/109 (09 Apr 2024 15:42) (132/86 - 160/109)  BP(mean): --  RR: 18 (09 Apr 2024 15:42) (18 - 18)  SpO2: --        PHYSICAL EXAM:  Gen: NAD, resting in bed  HEENT: Normocephalic, atraumatic  Neck: supple, no lymphadenopathy  CV: Regular rate & regular rhythm  Lungs: CTABL no wheeze  Abdomen: Soft, NTND+ BS present  Ext: Warm, well perfused no CCE  Neuro: non focal, awake, CN II-XII intact   Skin: no rash, no erythema  Psych: no SI, HI, Hallucination         TESTS & MEASUREMENTS:                        15.8   10.89 )-----------( 216      ( 08 Apr 2024 19:40 )             43.0     04-08    139  |  100  |  14  ----------------------------<  138<H>  4.1   |  27  |  1.0    Ca    9.7      08 Apr 2024 19:40    TPro  7.2  /  Alb  5.0  /  TBili  0.3  /  DBili  x   /  AST  25  /  ALT  40  /  AlkPhos  84  04-08      LIVER FUNCTIONS - ( 08 Apr 2024 19:40 )  Alb: 5.0 g/dL / Pro: 7.2 g/dL / ALK PHOS: 84 U/L / ALT: 40 U/L / AST: 25 U/L / GGT: x           Urinalysis Basic - ( 08 Apr 2024 19:40 )    Color: x / Appearance: x / SG: x / pH: x  Gluc: 138 mg/dL / Ketone: x  / Bili: x / Urobili: x   Blood: x / Protein: x / Nitrite: x   Leuk Esterase: x / RBC: x / WBC x   Sq Epi: x / Non Sq Epi: x / Bacteria: x        COVID-19 PCR: NotDetec (08 Apr 2024 13:00)  COVID-19 PCR: NotDetec (29 Mar 2024 03:45)      QRS axis to [] ° and NSR at a rate of [] BPM. There was no atrial enlargement. There was no ventricular hypertrophy. There were no ST-T changes and all intervals were normal.         CARDIOLOGY TESTING  12 Lead ECG:   Ventricular Rate 49 BPM    Atrial Rate 49 BPM    P-R Interval 216 ms    QRS Duration 86 ms    Q-T Interval 438 ms    QTC Calculation(Bazett) 395 ms    P Axis 52 degrees    R Axis 69 degrees    T Axis 77 degrees    Diagnosis Line Sinus bradycardia with 1st degree A-V block  Otherwise normal ECG    Confirmed by MARIA LUZ LLOYD MD ([id number]) on 4/8/2024 6:50:06 AM (04-07-24 @ 12:34)  12 Lead ECG:   Ventricular Rate 56 BPM    Atrial Rate 56 BPM    P-R Interval 220 ms    QRS Duration 88 ms    Q-T Interval 424 ms    QTC Calculation(Bazett) 409 ms    P Axis 36 degrees    R Axis 50 degrees    T Axis 48 degrees    Diagnosis Line Sinus bradycardia with 1st degree A-V block  Otherwise normal ECG    Confirmed by MARIA LUZ LLOYD MD ([id number]) on 4/3/2024 7:07:25 AM (04-02-24 @ 21:22)      MEDICATIONS  (STANDING):  amLODIPine   Tablet 5 milliGRAM(s) Oral daily  carvedilol 12.5 milliGRAM(s) Oral every 12 hours    MEDICATIONS  (PRN):  haloperidol     Tablet 5 milliGRAM(s) Oral every 6 hours PRN agitation  LORazepam     Tablet 2 milliGRAM(s) Oral every 6 hours PRN agitation  nicotine  Polacrilex Gum 4 milliGRAM(s) Oral every 2 hours PRN Smoking Cessation      ANTIBIOTICS:      All available historical data has been reviewed    ASSESSMENT  49y M admitted with Major depressive disorder with single episode        PROBLEMS    Hypertension  - BP elevated at IPP, could be related to anxiety (RN said he had anxiety attack this afternoon)  - Cont norvasc, coreg and monitor BP    Adrenal nodule  - Pt was noted to have adrenal nodule on CT during medical admission, he was seen by Endocrinology, per their note "Overall clinical suspicion of Cushing's syndrome, pheochromocytoma is very low"  - Serum cortisol level was wnl which is not suggestive of Cushing's  - Plasma metanephrines, serum aldosterone and plasma renin activity are pending, if abnormal can consult Endo for further recs    Depression/Anxiety/Psychosis  - Plan per IPP    Thank you for this consult, please contact me with any questions.

## 2024-04-10 RX ADMIN — CARVEDILOL PHOSPHATE 12.5 MILLIGRAM(S): 80 CAPSULE, EXTENDED RELEASE ORAL at 20:21

## 2024-04-10 RX ADMIN — CARVEDILOL PHOSPHATE 12.5 MILLIGRAM(S): 80 CAPSULE, EXTENDED RELEASE ORAL at 08:40

## 2024-04-10 RX ADMIN — AMLODIPINE BESYLATE 5 MILLIGRAM(S): 2.5 TABLET ORAL at 08:40

## 2024-04-10 RX ADMIN — Medication 20 MILLIGRAM(S): at 20:22

## 2024-04-10 NOTE — BH INPATIENT PSYCHIATRY PROGRESS NOTE - NSBHASSESSSUMMFT_PSY_ALL_CORE
Patient is a 49-year-old male, Greenlandic speaking, recently moved from [facility] 3 months ago, domiciled with wife and son on [place], past medical history of hypertension and ?pheochromocytoma (self-reported), no formal past psychiatric history per patient, no known prior inpatient psychiatric hospitalizations, no outpatient psychiatric care, denies history of prior substance admissions or rehab stays, who was BIB hospital security after being observed to be disorganized and agitated outside of the hospital. Patient was admitted to medicine for alcohol withdrawal. Psychiatry and Addiction Medicine consulted for suicidal ideation and alcohol abuse initially on 3/30/24, and the patient was cleared psychiatrically. Psychiatry was reconsulted on 4/4 for persisting disorientation and paranoia s/p alcohol detox. Patient was admitted to [facility] on 4/8/24 for further management.     On initial assessment on [facility], patient presented as disorganized, tangential, and with delusional preoccupations occurring despite completion of alcohol withdrawal management. His psychiatric assessment at that time was limited by language difficulties (even with ), in addition to ruminative thought processes. Patient did however endorse vague suicidality From what can be gathered, he has cardiovascular symptoms with overlap in psychiatric symptomatology (insomnia, paranoia, mood fluctuations and physiologic anxiety) related to an adrenal gland tumor diagnosed years prior with patient poorly compliant with recommended outpatient monitoring after first refusing surgery. There is additionally an alcohol abuse pattern and (per neuro eval) mention of TBI and hx of being prone to seizures confounding his clinical picture and patient likely has an ongoing delirio genic process, though he could not attend to formal cognitive testing at this time. During his medical admission, patient has been in increasing behavior dysregulation, and it was reported that he attempted to jump over a nurses station required chemical and physical restraints. While on IPP, patient has been in good behavior control, but given history it is recommended to monitor him closely. At this time the patient would benefit from continued inpatient psychiatric admission for gathering of additional collateral for a baseline, possible medication management, and safety planning. Patient may also benefit from MAT for alcohol use d/o.    #Psychosis unspecified  - largely resolved, etiology unclear, patient is now more appropriately alert, oriented, and behaved  - no antipsychotic medication recommended at this time (received haldol and Seroquel while on medical floors)    #Anxiety  - pervasive history per collateral from wife of anxiety and rare panic attacks  - pt may benefit from SSRI - discuss w/ pt     #Depression  - pt may benefit from SSRI - discuss w/ pt  - Oliver suicide scale when patient amenable  - may be acute stress reaction to recent alcohol use and stress from family/career    #Alcohol use d/o  - consider CATCH Team consult  - consider MAT  - consider inpatient substance treatment    # Adrenal nodule (per endocrinology consult)  -Seen on CT scan of the abdomen, only described as 1 .5 cm nodule with no comment on Hounsfield units, or washout  -Overall clinically does not have signs of excessive steroids, has a history of hypertension  -Denies history of diabetes easy bruising bone fractures wide violaceous striae significant weight gain, overall has no clinical apparent features of steroid excess, has noticed some palpitations, sweating recently  - physical exam did not reveal any wide violaceous striae, buffalo hump or moon facies  -Overall clinical suspicion of Cushing's syndrome, pheochromocytoma is very low and would not explain the acute manic episode that the patient experienced  -Difficult to evaluate for Cushing's inpatient as patient has been recently agitated,  can obtain a 24-hour urine cortisol   -Also recommend to obtain plasma metanephrine, serum aldosterone and plasma renin activity  but most likely would require evaluation of adrenal nodule outpatient in a non stressful setting    #HTN  - norvasc 5mg PO daily    #Medical comorbidities  - f/u CBC, CMP, lipids, TSH, a1c    #agitation  - For severe agitation not responding to behavioral intervention, consider offering haldol 5 mg po q6h prn, ativan 2 mg PO q6h prn, with escalation to IM if patient refusing PO and remains an imminent danger to self or others. If IM antipsychotic is administered, please perform follow-up ECG for QTc monitoring. Hold antipsychotics if Qtc>500 ms.       Patient is a 49-year-old male, Kuwaiti speaking, recently moved from [facility] 3 months ago, domiciled with wife and son on [place], past medical history of hypertension and ?pheochromocytoma (self-reported), no formal past psychiatric history per patient, no known prior inpatient psychiatric hospitalizations, no outpatient psychiatric care, denies history of prior substance admissions or rehab stays, who was BIB hospital security after being observed to be disorganized and agitated outside of the hospital. Patient was admitted to medicine for alcohol withdrawal. Psychiatry and Addiction Medicine consulted for suicidal ideation and alcohol abuse initially on 3/30/24, and the patient was cleared psychiatrically. Psychiatry was reconsulted on 4/4 for persisting disorientation and paranoia s/p alcohol detox. Patient was admitted to [facility] on 4/8/24 for further management.     On initial assessment on [facility], patient presented as disorganized, tangential, and with delusional preoccupations occurring despite completion of alcohol withdrawal management. His psychiatric assessment at that time was limited by language difficulties (even with ), in addition to ruminative thought processes. Patient did however endorse vague suicidality From what can be gathered, he has cardiovascular symptoms with overlap in psychiatric symptomatology (insomnia, paranoia, mood fluctuations and physiologic anxiety) related to an adrenal gland tumor diagnosed years prior with patient poorly compliant with recommended outpatient monitoring after first refusing surgery. There is additionally an alcohol abuse pattern and (per neuro eval) mention of TBI and hx of being prone to seizures confounding his clinical picture and patient likely has an ongoing delirio genic process, though he could not attend to formal cognitive testing at this time. During his medical admission, patient has been in increasing behavior dysregulation, and it was reported that he attempted to jump over a nurses station required chemical and physical restraints. While on IPP, patient has been in good behavior control, but given history it is recommended to monitor him closely. At this time the patient would benefit from continued inpatient psychiatric admission for gathering of additional collateral for a baseline, possible medication management, and safety planning.     #Psychosis unspecified  - largely resolved, etiology unclear, patient is now more appropriately alert, oriented, and behaved  - no antipsychotic medication recommended at this time (received haldol and Seroquel while on medical floors)    #Anxiety  - pervasive history per collateral from wife of anxiety and rare panic attacks  - start paxil 20mg qhs for anxiety     #Depression  - patient denying current symptoms  - may be acute stress reaction to recent alcohol use and stress from family/career    #Alcohol use d/o  - CATCH Team consult    # Adrenal nodule (per endocrinology consult)  -Seen on CT scan of the abdomen, only described as 1 .5 cm nodule with no comment on Hounsfield units, or washout  -Overall clinically does not have signs of excessive steroids, has a history of hypertension  -Denies history of diabetes easy bruising bone fractures wide violaceous striae significant weight gain, overall has no clinical apparent features of steroid excess, has noticed some palpitations, sweating recently  - physical exam did not reveal any wide violaceous striae, buffalo hump or moon facies  -Overall clinical suspicion of Cushing's syndrome, pheochromocytoma is very low and would not explain the acute manic episode that the patient experienced  -Difficult to evaluate for Cushing's inpatient as patient has been recently agitated,  can obtain a 24-hour urine cortisol   -Also recommend to obtain plasma metanephrine, serum aldosterone and plasma renin activity  but most likely would require evaluation of adrenal nodule outpatient in a non stressful setting    #HTN  - norvasc 5mg PO daily    #Medical comorbidities  - f/u CBC, CMP, lipids, TSH, a1c    #agitation  - For severe agitation not responding to behavioral intervention, consider offering haldol 5 mg po q6h prn, ativan 2 mg PO q6h prn, with escalation to IM if patient refusing PO and remains an imminent danger to self or others. If IM antipsychotic is administered, please perform follow-up ECG for QTc monitoring. Hold antipsychotics if Qtc>500 ms.

## 2024-04-10 NOTE — BH INPATIENT PSYCHIATRY PROGRESS NOTE - CURRENT MEDICATION
MEDICATIONS  (STANDING):  amLODIPine   Tablet 5 milliGRAM(s) Oral daily  carvedilol 12.5 milliGRAM(s) Oral every 12 hours    MEDICATIONS  (PRN):  haloperidol     Tablet 5 milliGRAM(s) Oral every 6 hours PRN agitation  LORazepam     Tablet 2 milliGRAM(s) Oral every 6 hours PRN agitation  nicotine  Polacrilex Gum 4 milliGRAM(s) Oral every 2 hours PRN Smoking Cessation   MEDICATIONS  (STANDING):  amLODIPine   Tablet 5 milliGRAM(s) Oral daily  carvedilol 12.5 milliGRAM(s) Oral every 12 hours  PARoxetine 20 milliGRAM(s) Oral at bedtime    MEDICATIONS  (PRN):  haloperidol     Tablet 5 milliGRAM(s) Oral every 6 hours PRN agitation  LORazepam     Tablet 2 milliGRAM(s) Oral every 6 hours PRN agitation  nicotine  Polacrilex Gum 4 milliGRAM(s) Oral every 2 hours PRN Smoking Cessation

## 2024-04-10 NOTE — BH INPATIENT PSYCHIATRY PROGRESS NOTE - PRN MEDS
MEDICATIONS  (PRN):  haloperidol     Tablet 5 milliGRAM(s) Oral every 6 hours PRN agitation  LORazepam     Tablet 2 milliGRAM(s) Oral every 6 hours PRN agitation  nicotine  Polacrilex Gum 4 milliGRAM(s) Oral every 2 hours PRN Smoking Cessation  
MEDICATIONS  (PRN):  haloperidol     Tablet 5 milliGRAM(s) Oral every 6 hours PRN agitation  LORazepam     Tablet 2 milliGRAM(s) Oral every 6 hours PRN agitation  nicotine  Polacrilex Gum 4 milliGRAM(s) Oral every 2 hours PRN Smoking Cessation

## 2024-04-10 NOTE — BH INPATIENT PSYCHIATRY PROGRESS NOTE - NSDCCRITERIA_PSY_ALL_CORE
improvement in paranoia, behavioral control, safety
improvement in paranoia, behavioral control, safety

## 2024-04-10 NOTE — BH INPATIENT PSYCHIATRY PROGRESS NOTE - NSBHFUPINTERVALHXFT_PSY_A_CORE
Nurse reports no overnight or interval events. Patient took all prescribed medications. Patient required no PRN medications since the last assessment.    On approach patient was located __ , doing __ , and with __ in the room. Patient was alert to self, location, date, and situation. When asked how you are doing today, patient responded "__". Patient      Patient is denying SI/AVH at this time. Patient is reporting no new symptoms. Patient is reporting no new side effects from medications. Patient is reporting that they are sleeping and eating okay. Patient is not reporting any additional questions or concerns at this time. Nurse reports no overnight or interval events. Patient took all prescribed medications. Patient required no PRN medications since the last assessment.    On approach patient was located in his room lying in bed but got up and was amenable to interview. Spoke to patient via [place]er  (ID ___).  Patient was alert to self, location, date, and situation. When asked how you are doing today, patient responded "I feel much better". Discussed with patient starting an SSRI medication, including risks and benefits for treating anxiety, and he agreed to try. Patient states "I want to go home as soon as possible", discussed with patient monitoring him for side effects of medications and coordinating aftercare. Patient states that he would be interested in resources for Hong [place]er speaking alcohol support groups, told patient we would inform addiction team and they would speak to him.     Patient is denying SI/AVH at this time. Patient is reporting no new symptoms. Patient is reporting no new side effects from medications. Patient is reporting that they are sleeping and eating okay. Patient is not reporting any additional questions or concerns at this time.

## 2024-04-10 NOTE — BH INPATIENT PSYCHIATRY PROGRESS NOTE - NSBHATTESTCOMMENTATTENDFT_PSY_A_CORE
Pt seen, chart reviewed and case discussed with treatment team. Agree with assessment and plan
Pt seen, chart reviewed and case discussed with treatment team. Agree with assessment and plan    I spoke to wife again personally in [place] and she was concerned about pts kevel of anxiety and basically described multiple life stressors lead to him having a breakdown where he became paranoid and panicky in context of not sleeping 4-5 nights. No manic symptoms described. She reported he has no alcohol hx and had been drinking more than usual socially over the past 2 weeks. Pt maybe exaggerating secondary to this behavior being drastically different form his usual and maybe related to strong Gnosticist beliefs. Wife insisted he would benefit from anxiolytics and to relay this to pt.

## 2024-04-10 NOTE — BH INPATIENT PSYCHIATRY PROGRESS NOTE - NSBHCHARTREVIEWVS_PSY_A_CORE FT
Vital Signs Last 24 Hrs  T(C): 35.6 (04-09-24 @ 15:42), Max: 35.6 (04-09-24 @ 15:42)  T(F): 96 (04-09-24 @ 15:42), Max: 96 (04-09-24 @ 15:42)  HR: 80 (04-09-24 @ 18:54) (73 - 80)  BP: 133/90 (04-09-24 @ 18:54) (132/86 - 160/109)  BP(mean): --  RR: 18 (04-09-24 @ 15:42) (18 - 18)  SpO2: --     Vital Signs Last 24 Hrs  T(C): 35.6 (04-10-24 @ 09:12), Max: 35.6 (04-09-24 @ 15:42)  T(F): 96.1 (04-10-24 @ 09:12), Max: 96.1 (04-10-24 @ 09:12)  HR: 73 (04-10-24 @ 09:12) (73 - 80)  BP: 123/71 (04-10-24 @ 09:12) (123/71 - 160/109)  BP(mean): --  RR: 17 (04-10-24 @ 09:12) (17 - 18)  SpO2: --

## 2024-04-10 NOTE — BH INPATIENT PSYCHIATRY PROGRESS NOTE - NSBHMETABOLIC_PSY_ALL_CORE_FT
BMI: BMI (kg/m2): 25 (04-08-24 @ 15:22)  HbA1c: A1C with Estimated Average Glucose Result: 6.3 % (04-08-24 @ 19:40)    Glucose: POCT Blood Glucose.: 117 mg/dL (03-31-24 @ 16:14)    BP: 133/90 (04-09-24 @ 18:54) (132/86 - 160/109)Vital Signs Last 24 Hrs  T(C): 35.6 (04-09-24 @ 15:42), Max: 35.6 (04-09-24 @ 15:42)  T(F): 96 (04-09-24 @ 15:42), Max: 96 (04-09-24 @ 15:42)  HR: 80 (04-09-24 @ 18:54) (73 - 80)  BP: 133/90 (04-09-24 @ 18:54) (132/86 - 160/109)  BP(mean): --  RR: 18 (04-09-24 @ 15:42) (18 - 18)  SpO2: --      Lipid Panel: Date/Time: 04-09-24 @ 07:10  Cholesterol, Serum: 176  LDL Cholesterol Calculated: 98  HDL Cholesterol, Serum: 29  Total Cholesterol/HDL Ration Measurement: --  Triglycerides, Serum: 244   BMI: BMI (kg/m2): 25 (04-08-24 @ 15:22)  HbA1c: A1C with Estimated Average Glucose Result: 6.3 % (04-08-24 @ 19:40)    Glucose: POCT Blood Glucose.: 117 mg/dL (03-31-24 @ 16:14)    BP: 123/71 (04-10-24 @ 09:12) (123/71 - 160/109)Vital Signs Last 24 Hrs  T(C): 35.6 (04-10-24 @ 09:12), Max: 35.6 (04-09-24 @ 15:42)  T(F): 96.1 (04-10-24 @ 09:12), Max: 96.1 (04-10-24 @ 09:12)  HR: 73 (04-10-24 @ 09:12) (73 - 80)  BP: 123/71 (04-10-24 @ 09:12) (123/71 - 160/109)  BP(mean): --  RR: 17 (04-10-24 @ 09:12) (17 - 18)  SpO2: --      Lipid Panel: Date/Time: 04-09-24 @ 07:10  Cholesterol, Serum: 176  LDL Cholesterol Calculated: 98  HDL Cholesterol, Serum: 29  Total Cholesterol/HDL Ration Measurement: --  Triglycerides, Serum: 244

## 2024-04-10 NOTE — BH INPATIENT PSYCHIATRY PROGRESS NOTE - NSBHMSEKNOWHOW_PSY_ALL_CORE
Friend Roula chapa would like pt to call her back when able. [phone]  
Current Events
Current Events
Ears: no ear pain and no hearing problems.Nose: no nasal congestion and no nasal drainage.Mouth/Throat: no dysphagia, no hoarseness and no throat pain.Neck: no lumps, no pain, no stiffness and no swollen glands.

## 2024-04-11 VITALS
TEMPERATURE: 97 F | SYSTOLIC BLOOD PRESSURE: 99 MMHG | DIASTOLIC BLOOD PRESSURE: 66 MMHG | HEART RATE: 63 BPM | RESPIRATION RATE: 16 BRPM

## 2024-04-11 RX ADMIN — CARVEDILOL PHOSPHATE 12.5 MILLIGRAM(S): 80 CAPSULE, EXTENDED RELEASE ORAL at 08:17

## 2024-04-11 RX ADMIN — Medication 2 MILLIGRAM(S): at 02:17

## 2024-04-11 RX ADMIN — AMLODIPINE BESYLATE 5 MILLIGRAM(S): 2.5 TABLET ORAL at 08:17

## 2024-04-11 NOTE — BH INPATIENT PSYCHIATRY DISCHARGE NOTE - NSBHDCMEDICALFT_PSY_A_CORE
# Adrenal nodule (per endocrinology consult)  -Seen on CT scan of the abdomen, only described as 1 .5 cm nodule with no comment on Hounsfield units, or washout  -Overall clinically does not have signs of excessive steroids, has a history of hypertension  -Denies history of diabetes easy bruising bone fractures wide violaceous striae significant weight gain, overall has no clinical apparent features of steroid excess, has noticed some palpitations, sweating recently  - physical exam did not reveal any wide violaceous striae, buffalo hump or moon facies  -Overall clinical suspicion of Cushing's syndrome, pheochromocytoma is very low and would not explain the acute manic episode that the patient experienced  -Difficult to evaluate for Cushing's inpatient as patient has been recently agitated,  can obtain a 24-hour urine cortisol   -Also recommend to obtain plasma metanephrine, serum aldosterone and plasma renin activity  but most likely would require evaluation of adrenal nodule outpatient in a non stressful setting    #HTN  - norvasc 5mg PO daily    #Medical comorbidities  - Chol 176  -   - HDL 29  - A1C 6.3  - TSH 1.08

## 2024-04-11 NOTE — BH DISCHARGE NOTE NURSING/SOCIAL WORK/PSYCH REHAB - NSCDUDCCRISIS_PSY_A_CORE
[facility]  1 [phone]) [facility] ([phone])  Text "WELL" to 23583  Website: www.[employer].com.[employer]/.National Suicide Prevention Lifeline 5 (168) 836-2540/.  [employer]  1 (224) LIFENET (105-8396)/988 Suicide and Crisis Lifeline

## 2024-04-11 NOTE — BH INPATIENT PSYCHIATRY DISCHARGE NOTE - DESCRIPTION
Samoan speaking, born and raised in [facility] [facility], moved to [facility] in Dec 2023

## 2024-04-11 NOTE — BH INPATIENT PSYCHIATRY DISCHARGE NOTE - NSBHMETABOLIC_PSY_ALL_CORE_FT
BMI: BMI (kg/m2): 25 (04-08-24 @ 15:22)  HbA1c: A1C with Estimated Average Glucose Result: 6.3 % (04-08-24 @ 19:40)    Glucose: POCT Blood Glucose.: 117 mg/dL (03-31-24 @ 16:14)    BP: 99/66 (04-11-24 @ 07:58) (99/66 - 160/109)Vital Signs Last 24 Hrs  T(C): 35.9 (04-11-24 @ 07:58), Max: 35.9 (04-11-24 @ 07:58)  T(F): 96.7 (04-11-24 @ 07:58), Max: 96.7 (04-11-24 @ 07:58)  HR: 63 (04-11-24 @ 07:58) (54 - 73)  BP: 99/66 (04-11-24 @ 07:58) (99/66 - 143/88)  BP(mean): --  RR: 16 (04-11-24 @ 07:58) (16 - 18)  SpO2: --      Lipid Panel: Date/Time: 04-09-24 @ 07:10  Cholesterol, Serum: 176  LDL Cholesterol Calculated: 98  HDL Cholesterol, Serum: 29  Total Cholesterol/HDL Ration Measurement: --  Triglycerides, Serum: 244

## 2024-04-11 NOTE — BH INPATIENT PSYCHIATRY DISCHARGE NOTE - HOSPITAL COURSE
While on IPP, patient was initially disorganized, paranoid and perseverative, but quickly returned to fair insight and judgment with good orientation with rest and anxiolytics alone, there was no antipsychotic medication given. Patient denied current or even recent suicidality, but has said that in the remote past there were thoughts, but risk assessment was low given many protective factors. Since then, patient has been in good behavior control, but given history it is recommended to monitor him closely. Patient was started on Paxil for management of anxiety, which per patient and collateral has been a reoccurring problem for years, he tolerated it well. Patient is amenable to followup in the outpatient setting while he is still abroad in the [place], but he plans to try and continue care when he returns to [place] as well. Patient no longer requires IPP level of care and will be discharged to outpatient followup.

## 2024-04-11 NOTE — BH INPATIENT PSYCHIATRY DISCHARGE NOTE - HPI (INCLUDE ILLNESS QUALITY, SEVERITY, DURATION, TIMING, CONTEXT, MODIFYING FACTORS, ASSOCIATED SIGNS AND SYMPTOMS)
Patient is a 49-year-old male, [place] speaking, recently moved from [facility] 3 months ago, domiciled with wife and son on [place], past medical history of hypertension and ?pheochromocytoma (self-reported), no formal past psychiatric history per patient, no known prior inpatient psychiatric hospitalizations, no outpatient psychiatric care, denies history of prior substance admissions or rehab stays, who was BIB hospital security after being observed to be disorganized and agitated outside of the hospital. Patient was admitted to medicine for alcohol withdrawal. Psychiatry and Addiction Medicine consulted for suicidal ideation and alcohol abuse initially on 3/30/24, and the patient was cleared psychiatrically. Psychiatry was reconsulted on 4/4 for persisting disorientation and paranoia s/p alcohol detox. Patient was admitted to [facility] on 4/8/24 for further management.     On approach patient was sitting on his bed, and was amenable to being interviewed. Spoke to patient via [place]  (ID [id number]). Patient was preoccupied with the team playing a song for him and kept answer each question with "just play the song". Patient states the song is his favorite, put give little other detail. The song is by a Surya Delvalle. Interview was limited by patient irritability and preoccupation with this song.    Patient is currently endorsing thoughts of suicide and is being vague about their mood. Patient is not disclosing any additional detail about current suicidality nor prior instances of NSSIB or SA, though he alluded to them. Patient is denying thoughts of wanting to harm other people or homicide. Patient is sleeping and eating okay. Patient is not endorsing anxiety at this time. Patient is not endorsing symptoms of adelaida or bipolar disorder at this time. Patient is not endorsing symptoms of PTSD at this time. Patient is not endorsing symptoms of psychosis at this time, specifically denying audio or visual hallucinations, and feelings of paranoia or distressing thoughts.     On Medical floors via  Psychiatry prior to transfer:  On assessment today, patient reports doing "good, I slept well." He explains "I feel discomfort inside, turning around, I can't explain it" which he notices when he wakes up.  I had stress because of I developed some kind of fear for death  I think its a withdrawal syndrome from alcohol and smoking  He admits to underlying stressors with [place]ocating to the [place], that he is worried about his son, dragging him to a different country.     When interviewed privately, patient affirms ongoing feelings of fear and being in danger. when I fall asleep, it's all good but when I wake up I don't feel well" elaborating on an internal sensation of "shakiness" and discomfort. He affirms "a little bit" of a paranoid feeling that "wasn't as intense" a long time ago, "like 10 years ago" reiterating an experience 10 years ago that included anxiety, chest pain and syncope prior to a hospital stay where the adrenal gland tumor was diagnosed.   He conveys suspicions and agitation occurring this hospital stay are happening "for the first time," that previously it was more so anxiety with some vague suspiciousness. He denies any paranoia or anxiousness today, stating "no everything is fine" and estimates last experiencing these feelings "probably 2-3 days ago, especially when I watch TV." He affirms ideas of references at times when watching TV, affirms thought broadcasting and affirms thought insertion. He also vaguely conveys instances of AVH and says "I'm thinking because of medications and drinking," "for example, they show one thing on TV but I see different things."  Patient conveys improved sleep at this time and has slept well for "about 2 or 3 nights." He affirms currently having death wish thoughts and replies to questions about SI by stating "I think it's yes more than no" "I can't explain it"  When prompted as to whether he would ever take his own life, he responds with "I think I will stop thinking of that from now on."  SI - "probably for a week, perhaps more," denies any plan ever coming to mind, denies any prior suicide attempts, and instead tells me how he "had a fear of flying."  He can not recall every previously having symptoms of adelaida    it's been going on for a while now, I would drink for a while a lot, then I would stop, would go back     Patient is provided with education surrounding his hospital course and diagnostic concerns. He is educated on recommendations for further evaluation and management on the inpatient psychiatric unit. He conveys agreement stating "we need to do that then."     probably 7th or 8th of April, 24    Collateral is obtained from patient's wife Lilly at bedside. She reports that he feels much better than before, he's not getting his panic attacks and worries anymore. She notes that he does not even remember anything from being paranoid and agitated. She conveys her beliefs that this stems from stress, then he stopped sleeping, started drinking and everything got worse from there. She feels that moving to a new country, that there was actual danger back home before they moved and the combination of these things. She notes that he had not previously experienced these symptoms, has no history of paranoia, psychiatric illness or psychiatric medications.

## 2024-04-11 NOTE — BH INPATIENT PSYCHIATRY DISCHARGE NOTE - NSBHFUPINTERVALHXFT_PSY_A_CORE
Nurse reports no overnight or interval events. Patient took all prescribed medications. Patient required no PRN medications since the last assessment.    On approach patient was located in his room lying in bed but got up and was amenable to interview. Spoke to patient via Maldivian  (ID ___).  Patient was alert to self, location, date, and situation. When asked how you are doing today, patient responded "I feel much better". Discussed with patient starting an SSRI medication, including risks and benefits for treating anxiety, and he agreed to try. Patient states "I want to go home as soon as possible", discussed with patient monitoring him for side effects of medications and coordinating aftercare. Patient states that he would be interested in resources for Maldivian speaking alcohol support groups, told patient we would inform addiction team and they would speak to him.     Patient is denying SI/AVH at this time. Patient is reporting no new symptoms. Patient is reporting no new side effects from medications. Patient is reporting that they are sleeping and eating okay. Patient is not reporting any additional questions or concerns at this time.

## 2024-04-11 NOTE — BH DISCHARGE NOTE NURSING/SOCIAL WORK/PSYCH REHAB - PATIENT PORTAL LINK FT
You can access the FollowMyHealth Patient Portal offered by [facility] by registering at the following website: [email or web address]. By joining [employer]’s FollowMyHealth portal, you will also be able to view your health information using other applications (apps) compatible with our system.

## 2024-04-11 NOTE — BH INPATIENT PSYCHIATRY DISCHARGE NOTE - REASON FOR ADMISSION
You were admitted to the inpatient psychiatric unit for disorganized behavior and altered mental status.

## 2024-04-11 NOTE — BH TREATMENT PLAN - NSTXALCDRGINTERRN_PSY_ALL_CORE
Monitor for signs and symptoms of ETOH W/D.  Encouragement of cessation of substance abuse.  Will provide a safe detox with medications

## 2024-04-11 NOTE — BH INPATIENT PSYCHIATRY DISCHARGE NOTE - NSBHASSESSSUMMFT_PSY_ALL_CORE
Patient is a 49-year-old male, Kittitian speaking, recently moved from [facility] 3 months ago, domiciled with wife and son on [place], past medical history of hypertension and ?pheochromocytoma (self-reported), no formal past psychiatric history per patient, no known prior inpatient psychiatric hospitalizations, no outpatient psychiatric care, denies history of prior substance admissions or rehab stays, who was BIB hospital security after being observed to be disorganized and agitated outside of the hospital. Patient was admitted to medicine for alcohol withdrawal. Psychiatry and Addiction Medicine consulted for suicidal ideation and alcohol abuse initially on 3/30/24, and the patient was cleared psychiatrically. Psychiatry was reconsulted on 4/4 for persisting disorientation and paranoia s/p alcohol detox. Patient was admitted to [facility] on 4/8/24 for further management.     On initial assessment on IPP, patient presented as disorganized, tangential, and with delusional preoccupations occurring despite completion of alcohol withdrawal management. His psychiatric assessment at that time was limited by language difficulties (even with ), in addition to ruminative thought processes. Patient did however endorse vague suicidality From what can be gathered, he has cardiovascular symptoms with overlap in psychiatric symptomatology (insomnia, paranoia, mood fluctuations and physiologic anxiety) related to an adrenal gland tumor diagnosed years prior with patient poorly compliant with recommended outpatient monitoring after first refusing surgery. There is additionally an alcohol abuse pattern and (per neuro eval) mention of TBI and hx of being prone to seizures confounding his clinical picture and patient likely has an ongoing delirio genic process, though he could not attend to formal cognitive testing at this time. During his medical admission, patient has been in increasing behavior dysregulation, and it was reported that he attempted to jump over a nurses station required chemical and physical restraints.     While on IPP, patient was initially disorganized, paranoid and perseverative, but quickly returned to fair insight and judgment with good orientation with rest and anxiolytics alone, there was no antipsychotic medication given. Patient denied current or even recent suicidality, but has said that in the remote past there were thoughts, but risk assessment was low given many protective factors. Since then, patient has been in good behavior control, but given history it is recommended to monitor him closely. Patient was started on Paxil for management of anxiety, which per patient and collateral has been a reoccurring problem for years, he tolerated it well. Patient is amenable to followup in the outpatient setting while he is still [place] in the [place], but he plans to try and continue care when he returns to [place] as well. Patient no longer requires IPP level of care and will be discharged to outpatient followup.    #Psychosis unspecified  - largely resolved, etiology unclear, patient is now more appropriately alert, oriented, and behaved  - no antipsychotic medication recommended at this time (received haldol and Seroquel while on medical floors)    #Anxiety  - pervasive history per collateral from wife of anxiety and rare panic attacks  - c/w paxil 20mg qhs for anxiety (started 4/10)     #Depression  - patient denying current symptoms  - may be acute stress reaction to recent alcohol use and stress from family/career    #Alcohol use d/o  - CATCH Team consult    # Adrenal nodule (per endocrinology consult)  -Seen on CT scan of the abdomen, only described as 1 .5 cm nodule with no comment on Hounsfield units, or washout  -Overall clinically does not have signs of excessive steroids, has a history of hypertension  -Denies history of diabetes easy bruising bone fractures wide violaceous striae significant weight gain, overall has no clinical apparent features of steroid excess, has noticed some palpitations, sweating recently  - physical exam did not reveal any wide violaceous striae, buffalo hump or moon facies  -Overall clinical suspicion of Cushing's syndrome, pheochromocytoma is very low and would not explain the acute manic episode that the patient experienced  -Difficult to evaluate for Cushing's inpatient as patient has been recently agitated,  can obtain a 24-hour urine cortisol   -Also recommend to obtain plasma metanephrine, serum aldosterone and plasma renin activity  but most likely would require evaluation of adrenal nodule outpatient in a non stressful setting    #HTN  - norvasc 5mg PO daily    #Medical comorbidities  - f/u CBC, CMP, lipids, TSH, a1c    #agitation  - For severe agitation not responding to behavioral intervention, consider offering haldol 5 mg po q6h prn, ativan 2 mg PO q6h prn, with escalation to IM if patient refusing PO and remains an imminent danger to self or others. If IM antipsychotic is administered, please perform follow-up ECG for QTc monitoring. Hold antipsychotics if Qtc>500 ms.

## 2024-04-11 NOTE — BH INPATIENT PSYCHIATRY DISCHARGE NOTE - NSDCCPCAREPLAN_GEN_ALL_CORE_FT
PRINCIPAL DISCHARGE DIAGNOSIS  Diagnosis: Brief psychotic disorder  Assessment and Plan of Treatment:       SECONDARY DISCHARGE DIAGNOSES  Diagnosis: Anxiety  Assessment and Plan of Treatment:

## 2024-04-12 NOTE — BH SOCIAL WORK CONFIRMATION FOLLOW UP NOTE - NSCOMMENTS_PSY_ALL_CORE
Caring contact call attempted by  (4/12) using Latvian  services; voicemail was left requesting a return call.     Javier refused a mental health follow up referral/ appointment. Education and support was provided by IPP team.     Caring contact call attempted by  (4/12) using Gambian  services; voicemail was left requesting a return call.

## 2024-04-13 LAB — RENIN PLAS-CCNC: <0.167 NG/ML/HR — LOW (ref 0.17–5.38)

## 2024-04-15 DIAGNOSIS — E87.6 HYPOKALEMIA: ICD-10-CM

## 2024-04-15 DIAGNOSIS — E27.9 DISORDER OF ADRENAL GLAND, UNSPECIFIED: ICD-10-CM

## 2024-04-15 DIAGNOSIS — Z78.1 PHYSICAL RESTRAINT STATUS: ICD-10-CM

## 2024-04-15 DIAGNOSIS — F29 UNSPECIFIED PSYCHOSIS NOT DUE TO A SUBSTANCE OR KNOWN PHYSIOLOGICAL CONDITION: ICD-10-CM

## 2024-04-15 DIAGNOSIS — R45.851 SUICIDAL IDEATIONS: ICD-10-CM

## 2024-04-15 DIAGNOSIS — E53.8 DEFICIENCY OF OTHER SPECIFIED B GROUP VITAMINS: ICD-10-CM

## 2024-04-15 DIAGNOSIS — F10.231 ALCOHOL DEPENDENCE WITH WITHDRAWAL DELIRIUM: ICD-10-CM

## 2024-04-15 DIAGNOSIS — I10 ESSENTIAL (PRIMARY) HYPERTENSION: ICD-10-CM

## 2024-04-15 DIAGNOSIS — Z74.3 NEED FOR CONTINUOUS SUPERVISION: ICD-10-CM

## 2024-04-15 DIAGNOSIS — G93.41 METABOLIC ENCEPHALOPATHY: ICD-10-CM

## 2024-04-15 DIAGNOSIS — E51.9 THIAMINE DEFICIENCY, UNSPECIFIED: ICD-10-CM

## 2024-04-15 DIAGNOSIS — F32.9 MAJOR DEPRESSIVE DISORDER, SINGLE EPISODE, UNSPECIFIED: ICD-10-CM

## 2024-04-15 DIAGNOSIS — E87.29 OTHER ACIDOSIS: ICD-10-CM

## 2024-04-18 DIAGNOSIS — R45.851 SUICIDAL IDEATIONS: ICD-10-CM

## 2024-04-18 DIAGNOSIS — Z11.52 ENCOUNTER FOR SCREENING FOR COVID-19: ICD-10-CM

## 2024-04-18 DIAGNOSIS — F32.A DEPRESSION, UNSPECIFIED: ICD-10-CM

## 2024-04-18 DIAGNOSIS — F41.9 ANXIETY DISORDER, UNSPECIFIED: ICD-10-CM

## 2024-04-18 DIAGNOSIS — F10.10 ALCOHOL ABUSE, UNCOMPLICATED: ICD-10-CM

## 2024-04-18 DIAGNOSIS — I10 ESSENTIAL (PRIMARY) HYPERTENSION: ICD-10-CM

## 2024-04-18 DIAGNOSIS — E27.9 DISORDER OF ADRENAL GLAND, UNSPECIFIED: ICD-10-CM

## 2024-04-18 DIAGNOSIS — F29 UNSPECIFIED PSYCHOSIS NOT DUE TO A SUBSTANCE OR KNOWN PHYSIOLOGICAL CONDITION: ICD-10-CM
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
## 2024-11-12 NOTE — BH CONSULTATION LIAISON PROGRESS NOTE - NSBHFUPINTERVALCCFT_PSY_A_CORE
Transitional Care Management Telephone Call Attempt    Discharge Date: 10/30/24  Discharge Location: [facility]: [facility]    Call Attempt Date: 11/12/2024  Call Attempt: First   Uriah ID# [id number] "I want to speak on zoom with my daughter, my wife and my son."    Assessment is conducted with use of [email or web address] [place] , Uriah, ID# [id number]